# Patient Record
Sex: FEMALE | Race: WHITE | Employment: FULL TIME | URBAN - METROPOLITAN AREA
[De-identification: names, ages, dates, MRNs, and addresses within clinical notes are randomized per-mention and may not be internally consistent; named-entity substitution may affect disease eponyms.]

---

## 2017-07-03 ENCOUNTER — HOSPITAL ENCOUNTER (OUTPATIENT)
Dept: ULTRASOUND IMAGING | Facility: HOSPITAL | Age: 47
Discharge: HOME | End: 2017-07-03
Attending: FAMILY MEDICINE

## 2017-07-03 VITALS — HEART RATE: 72 BPM | DIASTOLIC BLOOD PRESSURE: 88 MMHG | SYSTOLIC BLOOD PRESSURE: 128 MMHG

## 2017-07-03 DIAGNOSIS — Z13.9 SCREENING PROCEDURE: ICD-10-CM

## 2017-08-11 PROCEDURE — 88175 CYTOPATH C/V AUTO FLUID REDO: CPT | Performed by: INTERNAL MEDICINE

## 2017-08-11 PROCEDURE — 87624 HPV HI-RISK TYP POOLED RSLT: CPT | Performed by: INTERNAL MEDICINE

## 2017-08-19 ENCOUNTER — HOSPITAL ENCOUNTER (OUTPATIENT)
Age: 47
Discharge: HOME | End: 2017-08-19
Attending: EMERGENCY MEDICINE
Payer: COMMERCIAL

## 2017-08-19 VITALS
WEIGHT: 190 LBS | SYSTOLIC BLOOD PRESSURE: 131 MMHG | BODY MASS INDEX: 32 KG/M2 | TEMPERATURE: 98 F | RESPIRATION RATE: 16 BRPM | OXYGEN SATURATION: 97 % | HEART RATE: 79 BPM | DIASTOLIC BLOOD PRESSURE: 87 MMHG

## 2017-08-19 DIAGNOSIS — H60.502 ACUTE OTITIS EXTERNA OF LEFT EAR, UNSPECIFIED TYPE: Primary | ICD-10-CM

## 2017-08-19 PROCEDURE — 99213 OFFICE O/P EST LOW 20 MIN: CPT

## 2017-08-19 PROCEDURE — 99214 OFFICE O/P EST MOD 30 MIN: CPT

## 2017-08-19 RX ORDER — CIPROFLOXACIN AND DEXAMETHASONE 3; 1 MG/ML; MG/ML
4 SUSPENSION/ DROPS AURICULAR (OTIC) 2 TIMES DAILY
Qty: 7.5 ML | Refills: 0 | Status: SHIPPED | OUTPATIENT
Start: 2017-08-19 | End: 2017-08-19

## 2017-08-19 RX ORDER — AMOXICILLIN AND CLAVULANATE POTASSIUM 875; 125 MG/1; MG/1
1 TABLET, FILM COATED ORAL 2 TIMES DAILY
Qty: 20 TABLET | Refills: 0 | Status: SHIPPED | OUTPATIENT
Start: 2017-08-19 | End: 2017-08-29

## 2017-08-19 RX ORDER — CIPROFLOXACIN AND DEXAMETHASONE 3; 1 MG/ML; MG/ML
4 SUSPENSION/ DROPS AURICULAR (OTIC) 2 TIMES DAILY
Qty: 7.5 ML | Refills: 0 | Status: SHIPPED | OUTPATIENT
Start: 2017-08-19 | End: 2018-11-08 | Stop reason: ALTCHOICE

## 2017-08-19 RX ORDER — OFLOXACIN 3 MG/ML
5 SOLUTION AURICULAR (OTIC) DAILY
Qty: 10 ML | Refills: 1 | Status: SHIPPED | OUTPATIENT
Start: 2017-08-19 | End: 2017-08-19

## 2017-08-19 NOTE — ED INITIAL ASSESSMENT (HPI)
PCP gave patient ear drops . both ear is itchy and delicate. Hearing loss in early 20s unable to put hearing aids in.

## 2017-08-19 NOTE — ED PROVIDER NOTES
Patient Seen in: [facility] In [address] [place] [address]    History   Patient presents with:  Ear Problem Pain (neurosensory)    Stated Complaint: ear ache    HPI    The patient is a 80-year-old female with a past history of hearing loss, daily hear (800 MG TOTAL) BY MOUTH 3 (THREE) TIMES DAILY. loratadine 10 MG Oral Tab,  Take 10 mg by mouth nightly. ValACYclovir HCl 500 MG Oral Tab,  Take 1 tablet (500 mg total) by mouth once daily.    Multiple Vitamins-Iron (ONE DAILY/IRON) Oral Tab,  Take  by m Physical Exam    Constitutional: Well-developed well-nourished in no acute distress  Head: Normocephalic, no swelling or tenderness  Eyes: Nonicteric sclera, no conjunctival injection  ENT: Mild redness to the right external auditory canal but no s 1    Amoxicillin-Pot Clavulanate 875-125 MG Oral Tab  Take 1 tablet by mouth 2 (two) times daily. Qty: 20 tablet Refills: 0    ciprofloxacin-dexamethasone 0.3-0.1 % Otic Suspension  Place 4 drops into both ears 2 (two) times daily.   Qty: 7.5 mL Refills: 0

## 2017-09-18 PROBLEM — H60.313 ACUTE DIFFUSE OTITIS EXTERNA OF BOTH EARS: Status: ACTIVE | Noted: 2017-09-18

## 2017-10-28 PROCEDURE — 83002 ASSAY OF GONADOTROPIN (LH): CPT | Performed by: INTERNAL MEDICINE

## 2017-10-28 PROCEDURE — 82607 VITAMIN B-12: CPT | Performed by: INTERNAL MEDICINE

## 2017-10-28 PROCEDURE — 82746 ASSAY OF FOLIC ACID SERUM: CPT | Performed by: INTERNAL MEDICINE

## 2017-10-28 PROCEDURE — 82533 TOTAL CORTISOL: CPT | Performed by: INTERNAL MEDICINE

## 2017-10-28 PROCEDURE — 83001 ASSAY OF GONADOTROPIN (FSH): CPT | Performed by: INTERNAL MEDICINE

## 2017-10-28 PROCEDURE — 83835 ASSAY OF METANEPHRINES: CPT | Performed by: INTERNAL MEDICINE

## 2018-05-16 ENCOUNTER — MED REC SCAN ONLY (OUTPATIENT)
Dept: NEUROLOGY | Facility: CLINIC | Age: 48
End: 2018-05-16

## 2018-05-16 ENCOUNTER — TELEPHONE (OUTPATIENT)
Dept: NEUROLOGY | Facility: CLINIC | Age: 48
End: 2018-05-16

## 2018-05-16 ENCOUNTER — OFFICE VISIT (OUTPATIENT)
Dept: NEUROLOGY | Facility: CLINIC | Age: 48
End: 2018-05-16

## 2018-05-16 VITALS
RESPIRATION RATE: 16 BRPM | HEIGHT: 67 IN | SYSTOLIC BLOOD PRESSURE: 132 MMHG | BODY MASS INDEX: 29.82 KG/M2 | WEIGHT: 190 LBS | DIASTOLIC BLOOD PRESSURE: 90 MMHG | HEART RATE: 80 BPM

## 2018-05-16 DIAGNOSIS — M54.12 CERVICAL RADICULOPATHY: ICD-10-CM

## 2018-05-16 DIAGNOSIS — G95.9 MYELOPATHY (HCC): ICD-10-CM

## 2018-05-16 DIAGNOSIS — G56.03 BILATERAL CARPAL TUNNEL SYNDROME: Primary | ICD-10-CM

## 2018-05-16 PROCEDURE — 99204 OFFICE O/P NEW MOD 45 MIN: CPT | Performed by: OTHER

## 2018-05-16 RX ORDER — DOXEPIN HYDROCHLORIDE 10 MG/1
CAPSULE ORAL
Refills: 0 | COMMUNITY
Start: 2018-04-27 | End: 2019-10-31 | Stop reason: ALTCHOICE

## 2018-05-16 RX ORDER — BUPROPION HYDROCHLORIDE 150 MG/1
TABLET ORAL
Refills: 1 | COMMUNITY
Start: 2018-05-11 | End: 2018-05-31

## 2018-05-16 RX ORDER — TOPIRAMATE 25 MG/1
TABLET ORAL
Qty: 120 TABLET | Refills: 3 | Status: SHIPPED | OUTPATIENT
Start: 2018-05-16 | End: 2018-11-08 | Stop reason: ALTCHOICE

## 2018-05-16 NOTE — PROGRESS NOTES
Ms Lo Lied relates that she fell in February, falling backwards without head injury. This precipitated bilateral cervical paraspinal upper cervical paraspinal trip several, bilateral trapezial ridge pain and diffuse upper extremity paresthesias.   She is u Doxepin HCl 10 MG Oral Cap TK 1 C PO QHS Disp:  Rfl: 0   Aspirin-Acetaminophen-Caffeine (EXCEDRIN MIGRAINE OR) Take by mouth daily. Disp:  Rfl:    ibuprofen 100 MG/5ML Oral Suspension Take 200 mg by mouth as needed for Fever.  Disp:  Rfl:    aspirin 81 MG deficiency   • Depression     sees Dr. Jin Scot   • HEADACHES     migraines   • Herpes labialis    • HNP (herniated nucleus pulposus), cervical     C7   • Hyperlipidemia    • Hypothyroidism    • Pancreatitis 10/2016    2' heavy EtOH      Past Surgical History: otherwise  SKIN: denies any unusual skin lesions or rashes  EYES: no visual complaints or deficits  HEENT: denies nasal congestion, sinus pain or sore throat; hearing loss negative  RESPIRATORY: denies shortness of breath, wheezing or cough   CARDIOVASCULA spine.  I gave her prescription for physical therapy. I told her to call my office after the MRI scan and wait for the report prior to starting physical therapy.   Start her on Topamax for the migraines 25 mg twice daily for week and then 50 mg twice daily VITAMIN E 1 qd Disp:  Rfl:    VITAMIN B COMPLEX OR 1 qd Disp:  Rfl:        No Follow-up on file.     Saeed Ann MD, MD

## 2018-05-16 NOTE — TELEPHONE ENCOUNTER
Called [facility] BS for authorization of approval of MRI C-spine wo. Per automated system,  no authorization is required. Reference # [id number] Will call Pt. to inform. Pt. Informed no authorization is required. Transferred call to scheduling for   appt.

## 2018-05-18 ENCOUNTER — HOSPITAL ENCOUNTER (OUTPATIENT)
Dept: MRI IMAGING | Age: 48
Discharge: HOME | End: 2018-05-18
Attending: Other
Payer: COMMERCIAL

## 2018-05-18 ENCOUNTER — TELEPHONE (OUTPATIENT)
Dept: NEUROLOGY | Facility: CLINIC | Age: 48
End: 2018-05-18

## 2018-05-18 DIAGNOSIS — G95.9 MYELOPATHY (HCC): ICD-10-CM

## 2018-05-18 PROCEDURE — 72141 MRI NECK SPINE W/O DYE: CPT | Performed by: OTHER

## 2018-05-18 NOTE — TELEPHONE ENCOUNTER
Please call the patient tell her reviewed the report on the MRI scan of the cervical spine. And reveal a significant arthritic changes. No herniated disc. No spinal cord injury.   Please ask her to call the office in 2–3 weeks to inform me of how she is

## 2018-07-10 ENCOUNTER — HOSPITAL ENCOUNTER (EMERGENCY)
Facility: HOSPITAL | Age: 48
Discharge: HOME | End: 2018-07-10
Attending: EMERGENCY MEDICINE
Payer: COMMERCIAL

## 2018-07-10 VITALS
WEIGHT: 190 LBS | SYSTOLIC BLOOD PRESSURE: 130 MMHG | OXYGEN SATURATION: 97 % | BODY MASS INDEX: 29.82 KG/M2 | RESPIRATION RATE: 18 BRPM | DIASTOLIC BLOOD PRESSURE: 73 MMHG | TEMPERATURE: 98 F | HEART RATE: 68 BPM | HEIGHT: 67 IN

## 2018-07-10 DIAGNOSIS — M62.838 SPASM OF MUSCLE: Primary | ICD-10-CM

## 2018-07-10 PROCEDURE — 99284 EMERGENCY DEPT VISIT MOD MDM: CPT

## 2018-07-10 PROCEDURE — 96372 THER/PROPH/DIAG INJ SC/IM: CPT

## 2018-07-10 RX ORDER — METAXALONE 800 MG/1
400 TABLET ORAL 3 TIMES DAILY
Qty: 16 TABLET | Refills: 0 | Status: SHIPPED | OUTPATIENT
Start: 2018-07-10 | End: 2018-11-08 | Stop reason: ALTCHOICE

## 2018-07-10 RX ORDER — KETOROLAC TROMETHAMINE 30 MG/ML
30 INJECTION, SOLUTION INTRAMUSCULAR; INTRAVENOUS ONCE
Status: COMPLETED | OUTPATIENT
Start: 2018-07-10 | End: 2018-07-10

## 2018-07-10 RX ORDER — LIDOCAINE 50 MG/G
1 PATCH TOPICAL ONCE
Status: DISCONTINUED | OUTPATIENT
Start: 2018-07-10 | End: 2018-07-10

## 2018-07-10 RX ORDER — DEXAMETHASONE SODIUM PHOSPHATE 4 MG/ML
10 VIAL (ML) INJECTION ONCE
Status: COMPLETED | OUTPATIENT
Start: 2018-07-10 | End: 2018-07-10

## 2018-07-10 RX ORDER — OXYCODONE HYDROCHLORIDE AND ACETAMINOPHEN 5; 325 MG/1; MG/1
1-2 TABLET ORAL EVERY 4 HOURS PRN
Qty: 10 TABLET | Refills: 0 | Status: SHIPPED | OUTPATIENT
Start: 2018-07-10 | End: 2018-07-17

## 2018-07-10 RX ORDER — OXYCODONE HYDROCHLORIDE AND ACETAMINOPHEN 5; 325 MG/1; MG/1
1 TABLET ORAL ONCE
Status: COMPLETED | OUTPATIENT
Start: 2018-07-10 | End: 2018-07-10

## 2018-07-10 RX ORDER — DIAZEPAM 10 MG/1
10 TABLET ORAL ONCE
Status: COMPLETED | OUTPATIENT
Start: 2018-07-10 | End: 2018-07-10

## 2018-07-10 NOTE — ED NOTES
Again expressing some relief of pain, initially 10/10 and now 7/10.  sts \"it comes in waves but I do feel a little better\"

## 2018-07-10 NOTE — ED NOTES
Pt admits to feeling much better, pt is now able to sit upright on cart.   sts \"it took the edge off a little\"

## 2018-07-10 NOTE — ED INITIAL ASSESSMENT (HPI)
Patient reports she bent over to put her pants on this morning exacerbating her lower back pain and left hip pain    Currently in PT for sciatica and IT band issues

## 2018-07-10 NOTE — ED NOTES
Discharge instructions given and reviewed, told to follow up with pmd.  Medications as told side effects discussed. Return with any new or worsening symptoms. Verbalized knowledge. Pt escorted to exit, home with family member.

## 2018-07-10 NOTE — ED PROVIDER NOTES
Patient Seen in: [facility] AND [facility] Emergency Department    History   Patient presents with:  Back Pain (musculoskeletal)    Stated Complaint: back pain    HPI    27-year-old female presents for complaint of left lower back, buttock, thigh pain.   Patient Standard drinks or equivalent: 21 per week     Comment: occ      Review of Systems   Constitutional: Negative. HENT: Negative. Eyes: Negative. Respiratory: Negative. Cardiovascular: Negative. Gastrointestinal: Negative.     Genitourinary: Neurological: She is alert and oriented to person, place, and time. She has normal strength. No cranial nerve deficit or sensory deficit. Coordination normal.   Reflex Scores:       Patellar reflexes are 2+ on the right side and 2+ on the left side.   Skin: Take 1-2 tablets by mouth every 4 (four) hours as needed for Pain., Print Script, Disp-10 tablet, R-0    !! Metaxalone 800 MG Oral Tab  Take 0.5 tablets (400 mg total) by mouth 3 (three) times daily. , Normal, Disp-16 tablet, R-0    !! - Potential duplicate

## 2018-07-19 ENCOUNTER — MED REC SCAN ONLY (OUTPATIENT)
Dept: NEUROLOGY | Facility: CLINIC | Age: 48
End: 2018-07-19

## 2018-09-09 ENCOUNTER — HOSPITAL ENCOUNTER (OUTPATIENT)
Age: 48
Discharge: HOME | End: 2018-09-09
Attending: EMERGENCY MEDICINE
Payer: COMMERCIAL

## 2018-09-09 VITALS
BODY MASS INDEX: 29 KG/M2 | TEMPERATURE: 98 F | DIASTOLIC BLOOD PRESSURE: 81 MMHG | HEART RATE: 84 BPM | OXYGEN SATURATION: 97 % | SYSTOLIC BLOOD PRESSURE: 124 MMHG | RESPIRATION RATE: 16 BRPM | WEIGHT: 188 LBS
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
## 2018-09-09 DIAGNOSIS — S41.151A DOG BITE OF RIGHT UPPER EXTREMITY, INITIAL ENCOUNTER: Primary | ICD-10-CM

## 2018-09-09 PROCEDURE — 99213 OFFICE O/P EST LOW 20 MIN: CPT

## 2018-09-09 PROCEDURE — 99214 OFFICE O/P EST MOD 30 MIN: CPT

## 2018-09-09 RX ORDER — AMOXICILLIN AND CLAVULANATE POTASSIUM 875; 125 MG/1; MG/1
1 TABLET, FILM COATED ORAL 2 TIMES DAILY
Qty: 14 TABLET | Refills: 0 | Status: SHIPPED | OUTPATIENT
Start: 2018-09-09 | End: 2018-09-16

## 2018-09-09 NOTE — ED PROVIDER NOTES
Patient Seen in: [facility] In [address] [place] [address]    History   Patient presents with:  Laceration Abrasion (integumentary)    Stated Complaint: left hand infection    HPI    The patient is a 80-year-old female with past history of adrenal solo 124/81   Pulse 84   Resp 16   Temp 97.8 °F (36.6 °C)   Temp src Oral   SpO2 97 %   O2 Device None (Room air)       Current:/81   Pulse 84   Temp 97.8 °F (36.6 °C) (Oral)   Resp 16   Wt 85.3 kg   LMP 08/15/2018 (Approximate)   SpO2 97%   BMI 29.44 kg/

## 2018-10-08 ENCOUNTER — MED REC SCAN ONLY (OUTPATIENT)
Dept: NEUROLOGY | Facility: CLINIC | Age: 48
End: 2018-10-08

## 2018-11-09 ENCOUNTER — MED REC SCAN ONLY (OUTPATIENT)
Dept: NEUROLOGY | Facility: CLINIC | Age: 48
End: 2018-11-09

## 2019-10-15 NOTE — H&P
[address] [facility] [address] Gastroenterology                                                                                                  Clinic History and Physical     Pa and weight are stable. Known history of hypothyroid and mass on her adrenal gland followed by Dr. Chancy Bamberger [facility] endocrinology). States her thyroid function is stable.     Denies history of bleeding or clotting disorders          Pertinent Surgical Hx:  No History    Tobacco Use      Smoking status: Former Smoker        Packs/day: 0.50        Years: 20.00        Pack years: 8        Quit date: 6/2/2004        Years since quitting: 15.3      Smokeless tobacco: Never Used      Tobacco comment: quit 10 yrs ago as directed, Disp: 1 Package, Rfl: 0  Neomycin-Polymyxin-HC 3.5-65167-9 Otic Suspension, 2 drops both ears BID x 1 month, Disp: 1 Bottle, Rfl: 1  Hydrocortisone-Acetic Acid 1-2 % Otic Solution, 2 drops to affected ear BID x 3 weeks, Disp: 1 Bottle, Rfl: 1 abnormal bowel sounds noted, no masses are palpated  : no CVA tenderness  Skin: dry, warm, no jaundice  Ext: no cyanosis, clubbing or edema is evident.    Neuro: Alert and oriented x4, and patient is having movements of all 4 extremities   Psych: Pt has a of IBS. Would recommend FODMAP, probiotics, fiber supplements, and trial of dicyclomine as needed. Plan for follow-up after procedure or sooner if new issues arise.   She also requested names of primary care physicians as she would like to establish with All questions were answered to the patient’s satisfaction. The patient signed informed consent and elected to proceed with colonoscopy with intervention [i.e. polypectomy, stent placement, etc.] as indicated.           Orders This Visit:  Orders Placed This

## 2019-10-22 ENCOUNTER — OFFICE VISIT (OUTPATIENT)
Dept: GASTROENTEROLOGY | Facility: CLINIC | Age: 49
End: 2019-10-22
Payer: COMMERCIAL

## 2019-10-22 ENCOUNTER — TELEPHONE (OUTPATIENT)
Dept: GASTROENTEROLOGY | Facility: CLINIC | Age: 49
End: 2019-10-22

## 2019-10-22 VITALS
DIASTOLIC BLOOD PRESSURE: 82 MMHG | SYSTOLIC BLOOD PRESSURE: 125 MMHG | BODY MASS INDEX: 34.16 KG/M2 | HEART RATE: 80 BPM | HEIGHT: 65 IN | WEIGHT: 205 LBS

## 2019-10-22 DIAGNOSIS — R10.9 ABDOMINAL CRAMPING: ICD-10-CM

## 2019-10-22 DIAGNOSIS — K21.9 GASTROESOPHAGEAL REFLUX DISEASE, ESOPHAGITIS PRESENCE NOT SPECIFIED: ICD-10-CM

## 2019-10-22 DIAGNOSIS — R19.8 IRREGULAR BOWEL HABITS: Primary | ICD-10-CM

## 2019-10-22 DIAGNOSIS — Z12.11 SCREENING FOR COLON CANCER: ICD-10-CM

## 2019-10-22 PROCEDURE — 99204 OFFICE O/P NEW MOD 45 MIN: CPT | Performed by: NURSE PRACTITIONER

## 2019-10-22 NOTE — PATIENT INSTRUCTIONS
Recommend:  -Schedule colonoscopy w/Dr. Maria Dolores Morgan, Dr. More Bess, Dr. Betty Hastings w/ MAC  Dx: screening, irregular bowel habits, abd cramping   -Eligible for NE: No  -Prep: Split dose Colyte or equivalent  -Anti-platelets and anti-coagulants: ASA - continue as pr

## 2019-10-22 NOTE — TELEPHONE ENCOUNTER
Scheduled for: Colonoscopy 25609  Provider Name: Dr Marixa Torres  Date:  Susana Claude 12/24/19  Location:  [facility]  Sedation: MAC  Time: 1:00 pm, pt aware that Count includes the [facility] will call day before with arrival time  Prep: split dose colyte  Meds/Allergies Reconciled?: Cortisporin, Code

## 2019-10-26 ENCOUNTER — APPOINTMENT (OUTPATIENT)
Dept: LAB | Age: 49
End: 2019-10-26
Attending: NURSE PRACTITIONER
Payer: COMMERCIAL

## 2019-10-26 DIAGNOSIS — R10.9 ABDOMINAL CRAMPING: ICD-10-CM

## 2019-10-26 DIAGNOSIS — R19.8 IRREGULAR BOWEL HABITS: ICD-10-CM

## 2019-10-26 PROCEDURE — 36415 COLL VENOUS BLD VENIPUNCTURE: CPT | Performed by: NURSE PRACTITIONER

## 2019-10-26 PROCEDURE — 82784 ASSAY IGA/IGD/IGG/IGM EACH: CPT | Performed by: NURSE PRACTITIONER

## 2019-10-26 PROCEDURE — 83516 IMMUNOASSAY NONANTIBODY: CPT

## 2019-10-31 ENCOUNTER — OFFICE VISIT (OUTPATIENT)
Dept: FAMILY MEDICINE CLINIC | Facility: CLINIC | Age: 49
End: 2019-10-31
Payer: COMMERCIAL

## 2019-10-31 VITALS
DIASTOLIC BLOOD PRESSURE: 85 MMHG | BODY MASS INDEX: 33.91 KG/M2 | TEMPERATURE: 98 F | WEIGHT: 203.5 LBS | SYSTOLIC BLOOD PRESSURE: 122 MMHG | HEART RATE: 86 BPM | HEIGHT: 65 IN

## 2019-10-31 DIAGNOSIS — E03.9 HYPOTHYROIDISM, UNSPECIFIED TYPE: ICD-10-CM

## 2019-10-31 DIAGNOSIS — F41.9 ANXIETY AND DEPRESSION: ICD-10-CM

## 2019-10-31 DIAGNOSIS — Z86.69 HISTORY OF MIGRAINE HEADACHES: ICD-10-CM

## 2019-10-31 DIAGNOSIS — F10.21 HISTORY OF ALCOHOLISM (HCC): ICD-10-CM

## 2019-10-31 DIAGNOSIS — Z87.39 HISTORY OF HERNIATED INTERVERTEBRAL DISC: ICD-10-CM

## 2019-10-31 DIAGNOSIS — Z00.00 WELL ADULT EXAM: Primary | ICD-10-CM

## 2019-10-31 DIAGNOSIS — E78.00 HYPERCHOLESTEREMIA: ICD-10-CM

## 2019-10-31 DIAGNOSIS — Z86.39 HISTORY OF IRON DEFICIENCY: ICD-10-CM

## 2019-10-31 DIAGNOSIS — L65.9 HAIR LOSS: ICD-10-CM

## 2019-10-31 DIAGNOSIS — D35.02 ADENOMA OF LEFT ADRENAL GLAND: ICD-10-CM

## 2019-10-31 DIAGNOSIS — K76.9 LIVER LESION, RIGHT LOBE: ICD-10-CM

## 2019-10-31 DIAGNOSIS — R39.15 URINARY URGENCY: ICD-10-CM

## 2019-10-31 DIAGNOSIS — F32.A ANXIETY AND DEPRESSION: ICD-10-CM

## 2019-10-31 DIAGNOSIS — Z97.4 HEARING AID WORN: ICD-10-CM

## 2019-10-31 DIAGNOSIS — E66.9 OBESITY (BMI 30.0-34.9): ICD-10-CM

## 2019-10-31 PROBLEM — E66.811 OBESITY (BMI 30.0-34.9): Status: ACTIVE | Noted: 2019-10-31

## 2019-10-31 PROBLEM — H60.313 ACUTE DIFFUSE OTITIS EXTERNA OF BOTH EARS: Status: RESOLVED | Noted: 2017-09-18 | Resolved: 2019-10-31

## 2019-10-31 PROCEDURE — 99386 PREV VISIT NEW AGE 40-64: CPT | Performed by: FAMILY MEDICINE

## 2019-10-31 PROCEDURE — 99202 OFFICE O/P NEW SF 15 MIN: CPT | Performed by: FAMILY MEDICINE

## 2019-10-31 RX ORDER — GABAPENTIN 300 MG/1
CAPSULE ORAL 3 TIMES DAILY
Refills: 2 | COMMUNITY
Start: 2019-10-25 | End: 2020-07-30

## 2019-10-31 NOTE — PROGRESS NOTES
HPI:    Patient ID: Sunitha Duong is a 52year old female. HPI  No chief complaint on file. Saw gyne at [place] and had pap 7/2019  As mammo order, doing exam Monday    Wants to mammo every 2 yrs.   Getting colonoscopy Dec 24 2019  Has seen GI for dizziness, seizures, syncope, weakness, numbness and headaches. Hematological: Does not bruise/bleed easily. Psychiatric/Behavioral: Negative for behavioral problems, decreased concentration, self-injury, sleep disturbance and suicidal ideas.  The p session: Not on file      Stress: Not on file    Relationships      Social connections:        Talks on phone: Not on file        Gets together: Not on file        Attends [employer] service: Not on file        Active member of club or organization: Not on 08/11/2020  Annual Depression Screen due on 10/22/2020     gabapentin 300 MG Oral Cap, 300 MG tablet twice a day, Disp: , Rfl: 2  Lysine HCl 1000 MG Oral Tab, Take 1 tablet by mouth., Disp: , Rfl:   lurasidone HCl (LATUDA) 40 MG Oral Tab, Take 60 mg by abdelrahman normal.   Nose: Nose normal.   Mouth/Throat: Oropharynx is clear and moist. No oropharyngeal exudate. Eyes: Pupils are equal, round, and reactive to light. Conjunctivae and EOM are normal. Right eye exhibits no discharge. Left eye exhibits no discharge. UROGYNECOLOGY CLINIC    3. Urinary urgency    - URINALYSIS, ROUTINE; Future    4. History of herniated intervertebral disc      5. Adenoma of left adrenal gland  Having MRI done next week    6. Liver lesion, right lobe  As above    7.  Hypothyroidism, unspe

## 2019-11-09 ENCOUNTER — APPOINTMENT (OUTPATIENT)
Dept: LAB | Age: 49
End: 2019-11-09
Attending: NURSE PRACTITIONER
Payer: COMMERCIAL

## 2019-11-16 ENCOUNTER — LAB ENCOUNTER (OUTPATIENT)
Dept: LAB | Age: 49
End: 2019-11-16
Attending: FAMILY MEDICINE
Payer: COMMERCIAL

## 2019-11-16 DIAGNOSIS — R39.15 URINARY URGENCY: ICD-10-CM

## 2019-11-16 DIAGNOSIS — Z00.00 WELL ADULT EXAM: ICD-10-CM

## 2019-11-16 DIAGNOSIS — Z86.39 HISTORY OF IRON DEFICIENCY: ICD-10-CM

## 2019-11-16 DIAGNOSIS — E66.9 OBESITY (BMI 30.0-34.9): ICD-10-CM

## 2019-11-16 PROCEDURE — 80076 HEPATIC FUNCTION PANEL: CPT

## 2019-11-16 PROCEDURE — 82306 VITAMIN D 25 HYDROXY: CPT

## 2019-11-16 PROCEDURE — 36415 COLL VENOUS BLD VENIPUNCTURE: CPT

## 2019-11-16 PROCEDURE — 80048 BASIC METABOLIC PNL TOTAL CA: CPT

## 2019-11-16 PROCEDURE — 83036 HEMOGLOBIN GLYCOSYLATED A1C: CPT

## 2019-11-16 PROCEDURE — 85025 COMPLETE CBC W/AUTO DIFF WBC: CPT

## 2019-11-16 PROCEDURE — 82607 VITAMIN B-12: CPT

## 2019-11-16 PROCEDURE — 84466 ASSAY OF TRANSFERRIN: CPT

## 2019-11-16 PROCEDURE — 82728 ASSAY OF FERRITIN: CPT

## 2019-11-16 PROCEDURE — 83540 ASSAY OF IRON: CPT

## 2019-11-16 PROCEDURE — 84439 ASSAY OF FREE THYROXINE: CPT

## 2019-11-16 PROCEDURE — 80061 LIPID PANEL: CPT

## 2019-11-16 PROCEDURE — 81001 URINALYSIS AUTO W/SCOPE: CPT

## 2019-11-16 PROCEDURE — 84443 ASSAY THYROID STIM HORMONE: CPT

## 2019-11-19 ENCOUNTER — OFFICE VISIT (OUTPATIENT)
Dept: OTOLARYNGOLOGY | Facility: CLINIC | Age: 49
End: 2019-11-19
Payer: COMMERCIAL

## 2019-11-19 VITALS
BODY MASS INDEX: 33.82 KG/M2 | WEIGHT: 203 LBS | DIASTOLIC BLOOD PRESSURE: 94 MMHG | TEMPERATURE: 98 F | HEIGHT: 65 IN | SYSTOLIC BLOOD PRESSURE: 151 MMHG

## 2019-11-19 DIAGNOSIS — H60.62 CHRONIC OTITIS EXTERNA OF LEFT EAR, UNSPECIFIED TYPE: Primary | ICD-10-CM

## 2019-11-19 PROCEDURE — 99243 OFF/OP CNSLTJ NEW/EST LOW 30: CPT | Performed by: OTOLARYNGOLOGY

## 2019-11-20 NOTE — PROGRESS NOTES
Kolby Saez is a 52year old female.  Patient presents with:  Ear Problem: pt reports wears hearing aids, possible fungal infection, itchiness in both ears for 5-6 yrs     HPI:   She has had problems with drainage and itchiness in her ears for many heavy EtOH      Social History:  Social History    Tobacco Use      Smoking status: Former Smoker        Packs/day: 0.50        Years: 20.00        Pack years: 10        Quit date: 6/2/2004        Years since quitting: 15.4      Smokeless tobacco: Never Us unspecified type  She has what appears to be a chronic otitis externa both ear canals.   I cultured the ear canal today and will consider further intervention based upon the results of the culture  - AEROBIC BACTERIAL CULTURE; Future  - AEROBIC BACTERIAL CU

## 2019-11-21 DIAGNOSIS — R94.6 BORDERLINE ABNORMAL THYROID FUNCTION TEST: Primary | ICD-10-CM

## 2019-12-03 ENCOUNTER — OFFICE VISIT (OUTPATIENT)
Dept: UROLOGY | Facility: HOSPITAL | Age: 49
End: 2019-12-03
Attending: OBSTETRICS & GYNECOLOGY
Payer: COMMERCIAL

## 2019-12-03 VITALS
SYSTOLIC BLOOD PRESSURE: 140 MMHG | RESPIRATION RATE: 20 BRPM | DIASTOLIC BLOOD PRESSURE: 98 MMHG | WEIGHT: 203 LBS | BODY MASS INDEX: 33.82 KG/M2 | HEIGHT: 65 IN

## 2019-12-03 DIAGNOSIS — N32.81 OVERACTIVE BLADDER: Primary | ICD-10-CM

## 2019-12-03 PROCEDURE — 99212 OFFICE O/P EST SF 10 MIN: CPT

## 2019-12-03 PROCEDURE — 87086 URINE CULTURE/COLONY COUNT: CPT | Performed by: OBSTETRICS & GYNECOLOGY

## 2019-12-03 PROCEDURE — 51701 INSERT BLADDER CATHETER: CPT

## 2019-12-03 PROCEDURE — 81003 URINALYSIS AUTO W/O SCOPE: CPT | Performed by: OBSTETRICS & GYNECOLOGY

## 2019-12-03 NOTE — PROGRESS NOTES
ID: Macie Adams  : 1970  Date: 12/3/2019     Referred by Dr. Mara Rodriguez MD    Patient presents with:  Urinary Urgency  Incontinence      HPI:  The patient is a 52year-old female, , who presents for evaluation of urinary incontinence fo Grandmother         lung ca   • Heart Disorder Paternal Grandfather         MI   • Heart Disorder Paternal Uncle 61        CHF, CAD, PVD   • Heart Disorder Paternal Uncle 72         pacemaker, PVD      Social History    Tobacco Use      Smoking status: For ), Disp: 1 Bottle, Rfl: 2  triamcinolone acetonide 0.1 % External Cream, Apply to ear canal nightly with a q tip.  (Patient not taking: Reported on 12/3/2019 ), Disp: 60 g, Rfl: 3  CALCIUM + D OR, 1200 1 bid, Disp: , Rfl:   VITAMIN C OR, 1 qd, Disp: , Rfl: soft, mobile, non tender  Adnexa:no masses, non tender  Perineum: non tender  Anus: no hemorrhoids  Rectum: deferred.      PELVIS FLOOR NEUROMUSCULAR FUNCTION:  Strength:  3/5  Perineal Sensation:  Normal      PELVIC SUPPORT:  [place]:  0  Ant:  0  Post:  0  C

## 2019-12-10 ENCOUNTER — TELEPHONE (OUTPATIENT)
Dept: FAMILY MEDICINE CLINIC | Facility: CLINIC | Age: 49
End: 2019-12-10

## 2019-12-10 NOTE — TELEPHONE ENCOUNTER
Received last Pap results done in July 2019 at [address]  [facility]. These were normal.  Entered into health maintenance.   Patient also sent in note where notations were made by [facility] guarding AST ALT and alkaline phosphatase was elevated m

## 2019-12-23 NOTE — PROGRESS NOTES
[facility] Follow-up Visit    Dora biopsies demonstrate increased intraepithelial lymphocytes which could be consistent with lymphocytic/microscopic colitis     Social Hx:  + Former smoker  +21 EtOH/week  - Denies illicit drug use   - LMP:  Perimenopausal  - NSAIDs/ASA use: ASA 81 mg daily as needed for Migraine. 9 tablet 11   • gabapentin 300 MG Oral Cap 300 MG tablet twice a day  2   • Lysine HCl 1000 MG Oral Tab Take 1 tablet by mouth. • HM OMEGA-3-6-9 FATTY ACIDS OR Take by mouth.      • VALACYCLOVIR HCL 1 G Oral Tab TAKE 2 TABLETS(20 stiffness and joint swelling  BEHAVIOR/PSYCH:  negative for depressed mood    PHYSICAL EXAM:   Blood pressure (!) 155/100, pulse 91, height 5' 5\" (1.651 m), weight 207 lb (93.9 kg), not currently breastfeeding.     Gen: patient appears comfortable and in n follow up with PCP. Denies chest pain, shortness of breath, unusual headaches, lightheadedness, dizziness. Recommend:  -Discuss colonoscopy biopsy findings with attending GI  -Possible treatment for microscopic/lymphocytic colitis with budesonide?

## 2019-12-24 ENCOUNTER — SURGERY CENTER DOCUMENTATION (OUTPATIENT)
Dept: SURGERY | Age: 49
End: 2019-12-24

## 2019-12-24 ENCOUNTER — LAB REQUISITION (OUTPATIENT)
Dept: LAB | Facility: HOSPITAL | Age: 49
End: 2019-12-24
Payer: COMMERCIAL

## 2019-12-24 ENCOUNTER — TELEPHONE (OUTPATIENT)
Dept: GASTROENTEROLOGY | Facility: CLINIC | Age: 49
End: 2019-12-24

## 2019-12-24 DIAGNOSIS — Z01.89 ENCOUNTER FOR OTHER SPECIFIED SPECIAL EXAMINATIONS: ICD-10-CM

## 2019-12-24 PROCEDURE — 88305 TISSUE EXAM BY PATHOLOGIST: CPT | Performed by: INTERNAL MEDICINE

## 2019-12-24 PROCEDURE — 88313 SPECIAL STAINS GROUP 2: CPT | Performed by: INTERNAL MEDICINE

## 2019-12-24 PROCEDURE — 45380 COLONOSCOPY AND BIOPSY: CPT | Performed by: INTERNAL MEDICINE

## 2019-12-24 NOTE — TELEPHONE ENCOUNTER
Pt called to let us know she did not split her bowel prep. She actually completed the whole prep last night. She wanted to make sure Dr Mor Mcnally would be able to complete the procedure today.     I asked and she responded she is running clear    I told h

## 2019-12-24 NOTE — PROCEDURES
COLONOSCOPY REPORT    Alisa Reading     1970 Age 52year old   PCP Syed Esteves MD Endoscopist Marlena Rosales MD     Date of procedure: 19    Procedure: Colonoscopy w/ MAC sedation and random biopsies of the colon    Pre-operative diagno without evidence of angioectasias or inflammation. 5. PARMINDER: normal rectal tone, no masses palpated. Impression:   · No etiology for irregular bowels and pain  · Random biopsies taken to rule out microscopic colitis    Recommend:  · Await pathology.

## 2019-12-26 ENCOUNTER — TELEPHONE (OUTPATIENT)
Dept: GASTROENTEROLOGY | Facility: CLINIC | Age: 49
End: 2019-12-26

## 2019-12-26 NOTE — TELEPHONE ENCOUNTER
Pt had [facility] on 12/24 - DR told her there were no polyps - later a RN came in and told her they were sending out something to be biopsied - asking what is being biopsied if there are no polyps

## 2019-12-26 NOTE — TELEPHONE ENCOUNTER
I spoke to the pt and she was notified that Dr Eva Miramontes took random biopsies of her colon to r/o microscopic colitis. I also told her the pathology was also still in process.     The pt verbalizes understanding

## 2019-12-30 ENCOUNTER — OFFICE VISIT (OUTPATIENT)
Dept: GASTROENTEROLOGY | Facility: CLINIC | Age: 49
End: 2019-12-30
Payer: COMMERCIAL

## 2019-12-30 VITALS
SYSTOLIC BLOOD PRESSURE: 146 MMHG | HEIGHT: 65 IN | WEIGHT: 207 LBS | DIASTOLIC BLOOD PRESSURE: 90 MMHG | HEART RATE: 91 BPM | BODY MASS INDEX: 34.49 KG/M2

## 2019-12-30 DIAGNOSIS — R19.8 IRREGULAR BOWEL HABITS: Primary | ICD-10-CM

## 2019-12-30 DIAGNOSIS — R10.9 ABDOMINAL CRAMPING: ICD-10-CM

## 2019-12-30 PROCEDURE — 99214 OFFICE O/P EST MOD 30 MIN: CPT | Performed by: NURSE PRACTITIONER

## 2020-01-10 ENCOUNTER — PATIENT MESSAGE (OUTPATIENT)
Dept: GASTROENTEROLOGY | Facility: CLINIC | Age: 50
End: 2020-01-10

## 2020-01-10 ENCOUNTER — TELEPHONE (OUTPATIENT)
Dept: GASTROENTEROLOGY | Facility: CLINIC | Age: 50
End: 2020-01-10

## 2020-01-10 RX ORDER — BUDESONIDE 9 MG/1
9 TABLET, FILM COATED, EXTENDED RELEASE ORAL DAILY
Qty: 48 TABLET | Refills: 0 | Status: SHIPPED | OUTPATIENT
Start: 2020-01-10 | End: 2020-02-09

## 2020-01-10 NOTE — TELEPHONE ENCOUNTER
From: Minnie Simmonds  To: JOSEPHINE Glez  Sent: 1/10/2020 8:28 AM CST  Subject: Test Results Question    Hello,    I met with you over a week ago and you were awaiting a response from another doctor as to whether or not the colitis she identifie

## 2020-01-13 NOTE — TELEPHONE ENCOUNTER
This message was already addressed by Dr. Marixa Torres.   See result note attached to pathology dated 12/24/2019

## 2020-02-22 ENCOUNTER — HOSPITAL ENCOUNTER (OUTPATIENT)
Age: 50
Discharge: HOME | End: 2020-02-22
Attending: EMERGENCY MEDICINE
Payer: COMMERCIAL

## 2020-02-22 VITALS
DIASTOLIC BLOOD PRESSURE: 85 MMHG | RESPIRATION RATE: 18 BRPM | SYSTOLIC BLOOD PRESSURE: 141 MMHG | HEART RATE: 84 BPM | TEMPERATURE: 98 F | OXYGEN SATURATION: 98 %

## 2020-02-22 DIAGNOSIS — J06.9 VIRAL URI WITH COUGH: Primary | ICD-10-CM

## 2020-02-22 PROCEDURE — 99213 OFFICE O/P EST LOW 20 MIN: CPT

## 2020-02-22 PROCEDURE — 99214 OFFICE O/P EST MOD 30 MIN: CPT

## 2020-02-22 RX ORDER — BENZONATATE 100 MG/1
100 CAPSULE ORAL 3 TIMES DAILY PRN
Qty: 21 CAPSULE | Refills: 0 | Status: SHIPPED | OUTPATIENT
Start: 2020-02-22 | End: 2020-04-24

## 2020-02-22 RX ORDER — ALBUTEROL SULFATE 90 UG/1
2 AEROSOL, METERED RESPIRATORY (INHALATION) EVERY 4 HOURS PRN
Qty: 1 INHALER | Refills: 0 | Status: SHIPPED | OUTPATIENT
Start: 2020-02-22 | End: 2020-03-23

## 2020-02-22 NOTE — ED NOTES
Unable to swab for pertussis, to call pcp on Monday and speak to him about if he wants it done in office.

## 2020-04-27 PROBLEM — F33.9 RECURRENT MAJOR DEPRESSIVE DISORDER: Status: ACTIVE | Noted: 2020-04-27

## 2020-04-27 PROBLEM — F41.1 GAD (GENERALIZED ANXIETY DISORDER): Status: ACTIVE | Noted: 2020-04-27

## 2020-04-27 PROBLEM — F33.9 RECURRENT MAJOR DEPRESSIVE DISORDER (HCC): Status: ACTIVE | Noted: 2020-04-27

## 2020-05-11 ENCOUNTER — TELEPHONE (OUTPATIENT)
Dept: FAMILY MEDICINE CLINIC | Facility: CLINIC | Age: 50
End: 2020-05-11

## 2020-05-11 ENCOUNTER — VIRTUAL PHONE E/M (OUTPATIENT)
Dept: FAMILY MEDICINE CLINIC | Facility: CLINIC | Age: 50
End: 2020-05-11
Payer: COMMERCIAL

## 2020-05-11 VITALS — WEIGHT: 182 LBS | BODY MASS INDEX: 30 KG/M2

## 2020-05-11 DIAGNOSIS — E03.9 HYPOTHYROIDISM, UNSPECIFIED TYPE: ICD-10-CM

## 2020-05-11 DIAGNOSIS — E66.9 OBESITY (BMI 30.0-34.9): ICD-10-CM

## 2020-05-11 DIAGNOSIS — F41.9 ANXIETY AND DEPRESSION: ICD-10-CM

## 2020-05-11 DIAGNOSIS — E78.00 HYPERCHOLESTEREMIA: Primary | ICD-10-CM

## 2020-05-11 DIAGNOSIS — D35.02 ADENOMA OF LEFT ADRENAL GLAND: ICD-10-CM

## 2020-05-11 DIAGNOSIS — F32.A ANXIETY AND DEPRESSION: ICD-10-CM

## 2020-05-11 PROCEDURE — 99214 OFFICE O/P EST MOD 30 MIN: CPT | Performed by: FAMILY MEDICINE

## 2020-05-11 RX ORDER — ATORVASTATIN CALCIUM 20 MG/1
TABLET, FILM COATED ORAL
Qty: 90 TABLET | Refills: 0 | Status: SHIPPED | OUTPATIENT
Start: 2020-05-11 | End: 2020-09-20

## 2020-05-11 NOTE — TELEPHONE ENCOUNTER
Spoke with the patient who reports she has not been in contact with someone who was COVID-19 positive. Patient also denies having any symptom at the moment. Patient wanted to know if [facility] is offering antibody testing for COVID-19.  Patient made aware th

## 2020-05-11 NOTE — PROGRESS NOTES
Due to the COVID-19 emergency implementation plan, this patient's visit was converted to a telephone visit as agreed upon with the patient.     Please note that the following visit was completed using two-way, real-time interactive audio and/or video commun Hyperlipidemia 04/24/2020   • Hypothyroidism 04/24/2020   • Migraines 04/24/2020    Neurologist prescribing medication         MEDICATION LIST    Current Outpatient Medications:   •  escitalopram (LEXAPRO) 10 MG Oral Tab, 1/2 tablet daily x 6 days, then in available:     Wt 182 lb (82.6 kg)   BMI 30.29 kg/m²     Limited examination for this telephone/ video visit due to the coronavirus emergency    Patient was speaking in complete sentences, no increased work of breathing and very coherent and alert on the ph

## 2020-07-15 RX ORDER — VALACYCLOVIR HYDROCHLORIDE 1 G/1
2 TABLET, FILM COATED ORAL 2 TIMES DAILY
Qty: 4 TABLET | Refills: 2 | Status: SHIPPED | OUTPATIENT
Start: 2020-07-15 | End: 2021-06-23

## 2020-07-27 DIAGNOSIS — E78.00 HYPERCHOLESTEREMIA: ICD-10-CM

## 2020-07-27 DIAGNOSIS — E03.9 HYPOTHYROIDISM, UNSPECIFIED TYPE: ICD-10-CM

## 2020-07-28 ENCOUNTER — OFFICE VISIT (OUTPATIENT)
Dept: FAMILY MEDICINE CLINIC | Facility: CLINIC | Age: 50
End: 2020-07-28
Payer: COMMERCIAL

## 2020-07-28 VITALS
HEIGHT: 65 IN | HEART RATE: 86 BPM | DIASTOLIC BLOOD PRESSURE: 66 MMHG | WEIGHT: 186 LBS | TEMPERATURE: 98 F | SYSTOLIC BLOOD PRESSURE: 102 MMHG | BODY MASS INDEX: 30.99 KG/M2

## 2020-07-28 DIAGNOSIS — Z77.120 MOLD EXPOSURE: ICD-10-CM

## 2020-07-28 DIAGNOSIS — E78.2 MIXED HYPERLIPIDEMIA: ICD-10-CM

## 2020-07-28 DIAGNOSIS — F41.9 ANXIETY AND DEPRESSION: ICD-10-CM

## 2020-07-28 DIAGNOSIS — Z01.84 COVID-19 VIRUS IGG ANTIBODY TEST RESULT UNKNOWN: ICD-10-CM

## 2020-07-28 DIAGNOSIS — Z12.11 COLON CANCER SCREENING: ICD-10-CM

## 2020-07-28 DIAGNOSIS — Z00.00 WELL ADULT EXAM: Primary | ICD-10-CM

## 2020-07-28 DIAGNOSIS — Z86.69 HISTORY OF MIGRAINE HEADACHES: ICD-10-CM

## 2020-07-28 DIAGNOSIS — Z01.419 ENCOUNTER FOR GYNECOLOGICAL EXAMINATION: ICD-10-CM

## 2020-07-28 DIAGNOSIS — E03.9 HYPOTHYROIDISM, UNSPECIFIED TYPE: ICD-10-CM

## 2020-07-28 DIAGNOSIS — F32.A ANXIETY AND DEPRESSION: ICD-10-CM

## 2020-07-28 DIAGNOSIS — E78.00 HYPERCHOLESTEREMIA: ICD-10-CM

## 2020-07-28 DIAGNOSIS — E66.9 OBESITY (BMI 30.0-34.9): ICD-10-CM

## 2020-07-28 DIAGNOSIS — R05.9 COUGH: ICD-10-CM

## 2020-07-28 DIAGNOSIS — D35.02 ADENOMA OF LEFT ADRENAL GLAND: ICD-10-CM

## 2020-07-28 DIAGNOSIS — Z87.39 HISTORY OF HERNIATED INTERVERTEBRAL DISC: ICD-10-CM

## 2020-07-28 DIAGNOSIS — Z97.4 HEARING AID WORN: ICD-10-CM

## 2020-07-28 DIAGNOSIS — M25.511 ACUTE PAIN OF RIGHT SHOULDER: ICD-10-CM

## 2020-07-28 DIAGNOSIS — H91.93 BILATERAL HEARING LOSS, UNSPECIFIED HEARING LOSS TYPE: ICD-10-CM

## 2020-07-28 PROBLEM — Z86.39 HISTORY OF IRON DEFICIENCY: Status: RESOLVED | Noted: 2019-10-31 | Resolved: 2020-07-28

## 2020-07-28 PROBLEM — F33.9 RECURRENT MAJOR DEPRESSIVE DISORDER: Status: RESOLVED | Noted: 2020-04-27 | Resolved: 2020-07-28

## 2020-07-28 PROBLEM — F41.1 GAD (GENERALIZED ANXIETY DISORDER): Status: RESOLVED | Noted: 2020-04-27 | Resolved: 2020-07-28

## 2020-07-28 PROBLEM — F33.9 RECURRENT MAJOR DEPRESSIVE DISORDER (HCC): Status: RESOLVED | Noted: 2020-04-27 | Resolved: 2020-07-28

## 2020-07-28 PROCEDURE — 3074F SYST BP LT 130 MM HG: CPT | Performed by: FAMILY MEDICINE

## 2020-07-28 PROCEDURE — 99396 PREV VISIT EST AGE 40-64: CPT | Performed by: FAMILY MEDICINE

## 2020-07-28 PROCEDURE — 3078F DIAST BP <80 MM HG: CPT | Performed by: FAMILY MEDICINE

## 2020-07-28 PROCEDURE — 3008F BODY MASS INDEX DOCD: CPT | Performed by: FAMILY MEDICINE

## 2020-07-28 RX ORDER — TOPIRAMATE 100 MG/1
1 CAPSULE, EXTENDED RELEASE ORAL DAILY
COMMUNITY
Start: 2020-06-18 | End: 2021-05-11 | Stop reason: ALTCHOICE

## 2020-07-28 NOTE — PROGRESS NOTES
HPI:    Patient ID: Minh Rider is a 48year old female. HPI  Patient presents with: Well Adult        Review of Systems   Constitutional: Negative for activity change, appetite change, chills, fatigue, fever and unexpected weight change.    HEN History:   Diagnosis Date   • Adrenal adenoma, left    • ANEMIA     iron deficiency   • Depression     sees Dr. Abraham Rocha   • Hearing loss 04/24/2020    pt has bilat hearing aids   • Herpes labialis    • HNP (herniated nucleus pulposus), cervical     C7   • Hype file        Attends meetings of clubs or organizations: Not on file        Relationship status: Not on file      Intimate partner violence:        Fear of current or ex partner: Not on file        Emotionally abused: Not on file        Physically abused: N capsule by mouth daily.      • escitalopram (LEXAPRO) 10 MG Oral Tab one tablet daily 90 tablet 0   • atorvastatin 20 MG Oral Tab TAKE 1 TABLET(20 MG) BY MOUTH DAILY 90 tablet 0   • Levothyroxine Sodium 75 MCG Oral Tab       • Black Cohosh 540 MG Oral Cap T rash, tenderness, lesion or injury on the left labia. Cervix exhibits no motion tenderness, no discharge and no friability. Right adnexum displays no mass, no tenderness and no fullness. Left adnexum displays no mass, no tenderness and no fullness.     No v (CPT=71046); Future    7. COVID-19 virus IgG antibody test result unknown    - SARS-COV-2 IGG ANTIBODY; Future    8. Mold exposure    - XR CHEST PA + LAT CHEST (CPT=71046); Future    9. History of herniated intervertebral disc      10.  Acute pain of right

## 2020-07-29 ENCOUNTER — TELEPHONE (OUTPATIENT)
Dept: FAMILY MEDICINE CLINIC | Facility: CLINIC | Age: 50
End: 2020-07-29

## 2020-07-29 DIAGNOSIS — E03.9 HYPOTHYROIDISM, UNSPECIFIED TYPE: Primary | ICD-10-CM

## 2020-07-29 RX ORDER — LEVOTHYROXINE SODIUM 88 UG/1
88 TABLET ORAL
Qty: 90 TABLET | Refills: 1 | Status: SHIPPED | OUTPATIENT
Start: 2020-07-29 | End: 2020-10-06

## 2020-07-30 ENCOUNTER — TELEPHONE (OUTPATIENT)
Dept: FAMILY MEDICINE CLINIC | Facility: CLINIC | Age: 50
End: 2020-07-30

## 2020-07-30 ENCOUNTER — HOSPITAL ENCOUNTER (OUTPATIENT)
Dept: GENERAL RADIOLOGY | Facility: HOSPITAL | Age: 50
Discharge: HOME | End: 2020-07-30
Attending: FAMILY MEDICINE
Payer: COMMERCIAL

## 2020-07-30 DIAGNOSIS — Z77.120 MOLD EXPOSURE: ICD-10-CM

## 2020-07-30 DIAGNOSIS — R05.9 COUGH: ICD-10-CM

## 2020-07-30 PROCEDURE — 71046 X-RAY EXAM CHEST 2 VIEWS: CPT | Performed by: FAMILY MEDICINE

## 2020-07-30 NOTE — TELEPHONE ENCOUNTER
Please inform patient     received blood work results from [facility] lab done July 27, 2020. These were faxed to her office and do not appear in our epic system.     TSH is slightly elevated at 5.54 with T4 being normal.  Given that patient is taking levothy

## 2020-07-31 ENCOUNTER — LAB ENCOUNTER (OUTPATIENT)
Dept: LAB | Age: 50
End: 2020-07-31
Attending: FAMILY MEDICINE
Payer: COMMERCIAL

## 2020-07-31 DIAGNOSIS — Z01.84 COVID-19 VIRUS IGG ANTIBODY TEST RESULT UNKNOWN: ICD-10-CM

## 2020-07-31 DIAGNOSIS — Z00.00 WELL ADULT EXAM: ICD-10-CM

## 2020-07-31 LAB
ALBUMIN SERPL-MCNC: 3.5 G/DL (ref 3.4–5)
ALBUMIN/GLOB SERPL: 0.9 {RATIO} (ref 1–2)
ALP LIVER SERPL-CCNC: 60 U/L (ref 39–100)
ALT SERPL-CCNC: 23 U/L (ref 13–56)
ANION GAP SERPL CALC-SCNC: 6 MMOL/L (ref 0–18)
AST SERPL-CCNC: 16 U/L (ref 15–37)
BASOPHILS # BLD AUTO: 0.06 X10(3) UL (ref 0–0.2)
BASOPHILS NFR BLD AUTO: 1.3 %
BILIRUB SERPL-MCNC: 0.3 MG/DL (ref 0.1–2)
BUN BLD-MCNC: 18 MG/DL (ref 7–18)
BUN/CREAT SERPL: 20 (ref 10–20)
CALCIUM BLD-MCNC: 8.5 MG/DL (ref 8.5–10.1)
CHLORIDE SERPL-SCNC: 113 MMOL/L (ref 98–112)
CO2 SERPL-SCNC: 22 MMOL/L (ref 21–32)
CREAT BLD-MCNC: 0.9 MG/DL (ref 0.55–1.02)
DEPRECATED RDW RBC AUTO: 43.1 FL (ref 35.1–46.3)
EOSINOPHIL # BLD AUTO: 0.08 X10(3) UL (ref 0–0.7)
EOSINOPHIL NFR BLD AUTO: 1.7 %
ERYTHROCYTE [DISTWIDTH] IN BLOOD BY AUTOMATED COUNT: 14 % (ref 11–15)
GLOBULIN PLAS-MCNC: 3.7 G/DL (ref 2.8–4.4)
GLUCOSE BLD-MCNC: 100 MG/DL (ref 70–99)
HCT VFR BLD AUTO: 36.1 % (ref 35–48)
HGB BLD-MCNC: 11.7 G/DL (ref 12–16)
IMM GRANULOCYTES # BLD AUTO: 0.02 X10(3) UL (ref 0–1)
IMM GRANULOCYTES NFR BLD: 0.4 %
LYMPHOCYTES # BLD AUTO: 1.78 X10(3) UL (ref 1–4)
LYMPHOCYTES NFR BLD AUTO: 37.6 %
M PROTEIN MFR SERPL ELPH: 7.2 G/DL (ref 6.4–8.2)
MCH RBC QN AUTO: 27.1 PG (ref 26–34)
MCHC RBC AUTO-ENTMCNC: 32.4 G/DL (ref 31–37)
MCV RBC AUTO: 83.8 FL (ref 80–100)
MONOCYTES # BLD AUTO: 0.33 X10(3) UL (ref 0.1–1)
MONOCYTES NFR BLD AUTO: 7 %
NEUTROPHILS # BLD AUTO: 2.47 X10 (3) UL (ref 1.5–7.7)
NEUTROPHILS # BLD AUTO: 2.47 X10(3) UL (ref 1.5–7.7)
NEUTROPHILS NFR BLD AUTO: 52 %
OSMOLALITY SERPL CALC.SUM OF ELEC: 294 MOSM/KG (ref 275–295)
PATIENT FASTING Y/N/NP: NO
PLATELET # BLD AUTO: 307 10(3)UL (ref 150–450)
POTASSIUM SERPL-SCNC: 4 MMOL/L (ref 3.5–5.1)
RBC # BLD AUTO: 4.31 X10(6)UL (ref 3.8–5.3)
SODIUM SERPL-SCNC: 141 MMOL/L (ref 136–145)
WBC # BLD AUTO: 4.7 X10(3) UL (ref 4–11)

## 2020-07-31 PROCEDURE — 86769 SARS-COV-2 COVID-19 ANTIBODY: CPT

## 2020-07-31 PROCEDURE — 85025 COMPLETE CBC W/AUTO DIFF WBC: CPT

## 2020-07-31 PROCEDURE — 36415 COLL VENOUS BLD VENIPUNCTURE: CPT

## 2020-07-31 PROCEDURE — 80053 COMPREHEN METABOLIC PANEL: CPT

## 2020-08-01 LAB — SARS-COV-2 IGG SERPLBLD QL IA.RAPID: NEGATIVE

## 2020-08-14 ENCOUNTER — TELEPHONE (OUTPATIENT)
Dept: FAMILY MEDICINE CLINIC | Facility: CLINIC | Age: 50
End: 2020-08-14

## 2020-08-14 NOTE — TELEPHONE ENCOUNTER
Patient calling for results of antibody test and chest x-ray. All results and recommendations reviewed. Patient verbalizes understanding, denies further questions and agrees with plan of care.

## 2020-09-11 ENCOUNTER — NURSE TRIAGE (OUTPATIENT)
Dept: FAMILY MEDICINE CLINIC | Facility: CLINIC | Age: 50
End: 2020-09-11

## 2020-09-11 DIAGNOSIS — M25.512 LEFT SHOULDER PAIN, UNSPECIFIED CHRONICITY: Primary | ICD-10-CM

## 2020-09-11 NOTE — TELEPHONE ENCOUNTER
Patient states she has bulging disks in her neck and back, and believes this is why she's experiencing intermittent numbness and tingling over her left scapula for the last week and a half.     Patient requesting another referral for PT for her left shoulde

## 2020-09-14 NOTE — TELEPHONE ENCOUNTER
Patient called, and given Dr. Guero Prather message.  Advised she can go to [employer] website and look up neurologists and because she has PPO she does not require a referral    Patient verbalized understanding

## 2020-09-14 NOTE — TELEPHONE ENCOUNTER
Patient will be starting physical therapy for her shoulder this week     Patient states she has seen Dr. Asael Lima and Petey Cotto in the past and would not like to return to either of them    Please advise on a neurologist that you recommend patient to see.

## 2020-09-15 ENCOUNTER — OFFICE VISIT (OUTPATIENT)
Dept: PHYSICAL THERAPY | Age: 50
End: 2020-09-15
Attending: FAMILY MEDICINE
Payer: COMMERCIAL

## 2020-09-15 ENCOUNTER — MED REC SCAN ONLY (OUTPATIENT)
Dept: FAMILY MEDICINE CLINIC | Facility: CLINIC | Age: 50
End: 2020-09-15

## 2020-09-15 DIAGNOSIS — M25.511 ACUTE PAIN OF RIGHT SHOULDER: ICD-10-CM

## 2020-09-15 PROCEDURE — 97110 THERAPEUTIC EXERCISES: CPT

## 2020-09-15 PROCEDURE — 97161 PT EVAL LOW COMPLEX 20 MIN: CPT

## 2020-09-15 NOTE — PROGRESS NOTES
P.T. EVALUATION:   Referring Physician: Dr. Alisa Henriquez  Diagnosis: Left shoulder pain, unspecified chronicity (M25.512)  Acute pain of right shoulder (M25.511)     Date of Onset: July 2020 Date of Service: 9/15/2020     PATIENT SUMMARY   Sunitha kirby Sangeetha Ambrocio would benefit from skilled Physical Therapy to address the above impairments to relieve pain.     Precautions:  None     OBJECTIVE:   Observation: pt ambulates independently; R shoulder hiking noted with B shoulder abduction ROM    AROM:   [facility]ervica as soon as possible to [phone].  If you have any questions, please contact me at Dept: [phone]    Sincerely,  Electronically signed by therapist: Umang Baez PT, DPT    [de-identified] certification required: Yes  I certify the need for these servic

## 2020-09-18 ENCOUNTER — OFFICE VISIT (OUTPATIENT)
Dept: PHYSICAL THERAPY | Age: 50
End: 2020-09-18
Attending: FAMILY MEDICINE
Payer: COMMERCIAL

## 2020-09-18 PROCEDURE — 97110 THERAPEUTIC EXERCISES: CPT

## 2020-09-18 NOTE — PROGRESS NOTES
Diagnosis:  Left shoulder pain, unspecified chronicity (M25.512)  Acute pain of right shoulder (M25.511)           Next MD visit: none scheduled  Fall Risk: standard         Precautions: n/a          Medication Changes since last visit?: No    Subjective:

## 2020-09-20 DIAGNOSIS — E78.00 HYPERCHOLESTEREMIA: ICD-10-CM

## 2020-09-21 RX ORDER — ATORVASTATIN CALCIUM 20 MG/1
TABLET, FILM COATED ORAL
Qty: 90 TABLET | Refills: 1 | Status: SHIPPED | OUTPATIENT
Start: 2020-09-21 | End: 2020-10-06

## 2020-09-22 ENCOUNTER — OFFICE VISIT (OUTPATIENT)
Dept: PHYSICAL THERAPY | Age: 50
End: 2020-09-22
Attending: FAMILY MEDICINE
Payer: COMMERCIAL

## 2020-09-22 PROCEDURE — 97110 THERAPEUTIC EXERCISES: CPT

## 2020-09-22 NOTE — PROGRESS NOTES
Diagnosis:  Left shoulder pain, unspecified chronicity (M25.512)  Acute pain of right shoulder (M25.511)           Next MD visit: none scheduled  Fall Risk: standard         Precautions: n/a          Medication Changes since last visit?: No    Subjective: 2x10  - supine B shoulder flexion with wand 2x10  - sidelying R shoulder ER 1x12  - standing B shoulder extension with wand 2x10  - standing B scap rows with GSC 2x10  - standing B shoulder extension with Vabad[address] 41 2x10  -    Manual Therapy    - AP GH joint mobs

## 2020-09-25 ENCOUNTER — OFFICE VISIT (OUTPATIENT)
Dept: PHYSICAL THERAPY | Age: 50
End: 2020-09-25
Attending: FAMILY MEDICINE
Payer: COMMERCIAL

## 2020-09-25 PROCEDURE — 97110 THERAPEUTIC EXERCISES: CPT

## 2020-09-25 NOTE — PROGRESS NOTES
Diagnosis:  Left shoulder pain, unspecified chronicity (M25.512)  Acute pain of right shoulder (M25.511)           Next MD visit: none scheduled  Fall Risk: standard         Precautions: n/a          Medication Changes since last visit?: No    Subjective: supine C-retraction over EOB 3 x 10  - supine c-retraction with towel under occiput 2x10  - supine R/L c-rotation 15x  - supine c-retraction with self OP with towel under occiput 2x10  - supine B shoulder flexion rhythmic stabilization CW/CCW 2# DB 2 x 20

## 2020-09-29 ENCOUNTER — OFFICE VISIT (OUTPATIENT)
Dept: PHYSICAL THERAPY | Age: 50
End: 2020-09-29
Attending: FAMILY MEDICINE
Payer: COMMERCIAL

## 2020-09-29 PROCEDURE — 97110 THERAPEUTIC EXERCISES: CPT

## 2020-09-29 NOTE — PROGRESS NOTES
Diagnosis:  Left shoulder pain, unspecified chronicity (M25.512)  Acute pain of right shoulder (M25.511)           Next MD visit: none scheduled  Fall Risk: standard         Precautions: n/a          Medication Changes since last visit?: No    Subjective: DB  - standing B shoulder extension with wand 3 x 10  - standing B scap rows with GSC 3 x 10  - standing B shoulder extension with Vabad[address] 41 3 x 10  - standing B horizontal abduction with Vabad[address] 41 2 x 10  - standing R shoulder ER with YTB 3 x 10   - standing B should reps with no increased pain noted to address deficits. PT and patient goals are in progress.     Goals:    1- Pt will be I with maintenance and progression of HEP  2- Pt will report 1/10 pain or less with ADLs  3- Pt will demo increase in BUE strength to 5/

## 2020-10-02 ENCOUNTER — OFFICE VISIT (OUTPATIENT)
Dept: PHYSICAL THERAPY | Age: 50
End: 2020-10-02
Attending: FAMILY MEDICINE
Payer: COMMERCIAL

## 2020-10-02 PROCEDURE — 97110 THERAPEUTIC EXERCISES: CPT

## 2020-10-02 NOTE — PROGRESS NOTES
Diagnosis:  Left shoulder pain, unspecified chronicity (M25.512)  Acute pain of right shoulder (M25.511)           Next MD visit: none scheduled  Fall Risk: standard         Precautions: n/a          Medication Changes since last visit?: No    Subjective: extension with Vabad[address] 41 3 x 10  - standing B shoulder scaption 2# DB 2 x 10   - supine c-retraction with self OP with towel under occiput 2x10  - supine B shoulder flexion rhythmic stabilization CW/CCW 2# DB 2 x 20 each  - supine B shoulder flexion OH 2# DB 2 x towel under occiput 2x10  - supine B shoulder flexion with wand 2x10  - sidelying R shoulder ER 1x12  - standing B shoulder extension with wand 2x10  - standing B scap rows with GSC 2x10  - standing B shoulder extension with Vabad[address] 41 2x10  -    Manual Therapy

## 2020-10-05 ENCOUNTER — LAB ENCOUNTER (OUTPATIENT)
Dept: LAB | Age: 50
End: 2020-10-05
Attending: FAMILY MEDICINE
Payer: COMMERCIAL

## 2020-10-05 DIAGNOSIS — E78.00 HYPERCHOLESTEREMIA: ICD-10-CM

## 2020-10-05 DIAGNOSIS — E03.9 HYPOTHYROIDISM, UNSPECIFIED TYPE: ICD-10-CM

## 2020-10-05 DIAGNOSIS — E03.9 HYPOTHYROIDISM (ACQUIRED): ICD-10-CM

## 2020-10-05 PROCEDURE — 84443 ASSAY THYROID STIM HORMONE: CPT | Performed by: FAMILY MEDICINE

## 2020-10-05 PROCEDURE — 80061 LIPID PANEL: CPT | Performed by: FAMILY MEDICINE

## 2020-10-05 PROCEDURE — 80053 COMPREHEN METABOLIC PANEL: CPT

## 2020-10-05 PROCEDURE — 36415 COLL VENOUS BLD VENIPUNCTURE: CPT | Performed by: FAMILY MEDICINE

## 2020-10-05 PROCEDURE — 84439 ASSAY OF FREE THYROXINE: CPT | Performed by: FAMILY MEDICINE

## 2020-10-06 ENCOUNTER — OFFICE VISIT (OUTPATIENT)
Dept: PHYSICAL THERAPY | Age: 50
End: 2020-10-06
Attending: FAMILY MEDICINE
Payer: COMMERCIAL

## 2020-10-06 DIAGNOSIS — E78.00 HYPERCHOLESTEREMIA: ICD-10-CM

## 2020-10-06 DIAGNOSIS — E03.9 HYPOTHYROIDISM, UNSPECIFIED TYPE: ICD-10-CM

## 2020-10-06 PROCEDURE — 97110 THERAPEUTIC EXERCISES: CPT

## 2020-10-06 RX ORDER — LEVOTHYROXINE SODIUM 88 UG/1
88 TABLET ORAL
Qty: 90 TABLET | Refills: 3 | Status: SHIPPED | OUTPATIENT
Start: 2020-10-06

## 2020-10-06 RX ORDER — ATORVASTATIN CALCIUM 20 MG/1
20 TABLET, FILM COATED ORAL DAILY
Qty: 90 TABLET | Refills: 3 | Status: SHIPPED | OUTPATIENT
Start: 2020-10-06 | End: 2022-01-19

## 2020-10-06 NOTE — PROGRESS NOTES
Diagnosis:  Left shoulder pain, unspecified chronicity (M25.512)  Acute pain of right shoulder (M25.511)           Next MD visit: none scheduled  Fall Risk: standard         Precautions: n/a          Medication Changes since last visit?: No    Subjective: CW/CCW 3# DB 2 x 20 each  - sidelying R scaption 3 x 10 AROM with 2# DB  - sidelying R shoulder ER 2# DB 3 x 10  - standing B shoulder ext with nyla 20# 3 x 10  - standing B scap rows with rope 20# 3 x 10  - standing B shoulder ER with Vabaduse 41 2 x 10  - supine shoulder extension with Vabad[address] 41 3 x 10  - standing R shoulder ER with YTB 2 x 10   - supine C-retraction over EOB 3 x 10  - supine c-retraction with towel under occiput 2x10  - supine R/L c-rotation 15x  - supine c-retraction with self OP with towel under occi

## 2020-10-09 ENCOUNTER — OFFICE VISIT (OUTPATIENT)
Dept: PHYSICAL THERAPY | Age: 50
End: 2020-10-09
Attending: FAMILY MEDICINE
Payer: COMMERCIAL

## 2020-10-09 PROCEDURE — 97110 THERAPEUTIC EXERCISES: CPT

## 2020-10-13 ENCOUNTER — OFFICE VISIT (OUTPATIENT)
Dept: PHYSICAL THERAPY | Age: 50
End: 2020-10-13
Attending: FAMILY MEDICINE
Payer: COMMERCIAL

## 2020-10-13 PROCEDURE — 97110 THERAPEUTIC EXERCISES: CPT

## 2020-10-13 NOTE — PROGRESS NOTES
Diagnosis:  Left shoulder pain, unspecified chronicity (M25.512)  Acute pain of right shoulder (M25.511)           Next MD visit: none scheduled  Fall Risk: standard         Precautions: n/a          Medication Changes since last visit?: No    Subjective: supine c-retraction with self OP with towel under occiput 2 x 10  - sidelying R shoulder ER 2# DB 3 x 10  - standing B shoulder ext with nyla 20# 3 x 10  - standing B scap rows with rope 20# 3 x 10 - standing B shoulder extension with stick 2 x 20 AAROM  - standing B shoulder scaption 2# DB 2 x 10   - supine c-retraction with self OP with towel under occiput 2x10  - supine B shoulder flexion rhythmic stabilization CW/CCW 2# DB 2 x 20 each  - supine B shoulder flexion OH 2# DB 2 x 10  - supine B horizontal ab supine B shoulder flexion with wand 2x10  - sidelying R shoulder ER 1x12  - standing B shoulder extension with wand 2x10  - standing B scap rows with GSC 2x10  - standing B shoulder extension with Vabaduse 41 2x10  -    Manual Therapy          - AP GH joint mobs t

## 2020-10-15 ENCOUNTER — OFFICE VISIT (OUTPATIENT)
Dept: PHYSICAL THERAPY | Age: 50
End: 2020-10-15
Attending: FAMILY MEDICINE
Payer: COMMERCIAL

## 2020-10-15 PROCEDURE — 97110 THERAPEUTIC EXERCISES: CPT

## 2020-10-15 NOTE — PROGRESS NOTES
Diagnosis:  Left shoulder pain, unspecified chronicity (M25.512)  Acute pain of right shoulder (M25.511)           Next MD visit: none scheduled  Fall Risk: standard         Precautions: n/a          Medication Changes since last visit?: No    Subjective: shoulder ext with nyla 20# 3 x 10  - standing B scap rows with rope 20# 3 x 10  - standing B serratus punches with GSC 2x10 - standing B shoulder extension with stick 2 x 20 AAROM  - standing B shoulder IR behind back AROM x 20  - reassess strength and cerv flexion rhythmic stabilization CW/CCW 2# DB 2 x 20 each  - supine B shoulder flexion OH 2# DB 2 x 10  - supine B horizontal abd/adduction 2# DB 2 x 10  - sidelying R scaption 2 x 10 AROM with 2# DB  - sidelying R shoulder ER 2# 3 x 10  - standing B shoulde abd/adduction 1# DB 2 x 10  - sidelying R shoulder ER 3 x 10 AROM  - sidelying R scaption 2 x 10 AROM  - standing B shoulder extension with wand 2x10  - standing B scap rows with GSC 2 x 10  - standing B shoulder extension with Vabaduse 41 2 x 10  - standing R farideh

## 2020-12-09 ENCOUNTER — TELEPHONE (OUTPATIENT)
Dept: FAMILY MEDICINE CLINIC | Facility: CLINIC | Age: 50
End: 2020-12-09

## 2020-12-09 DIAGNOSIS — E03.9 HYPOTHYROIDISM, UNSPECIFIED TYPE: ICD-10-CM

## 2020-12-09 DIAGNOSIS — R68.89 COLD INTOLERANCE: Primary | ICD-10-CM

## 2020-12-09 NOTE — TELEPHONE ENCOUNTER
Verified name and . Patient reports that she has had anemia in the past. She is requesting a CBC blood test to check for anemia as she states she has been \"feeling cold and freezing all of the time. \"    Please advise and thank you.

## 2020-12-09 NOTE — TELEPHONE ENCOUNTER
I ordered blood counts as well as iron levels. Thyroid levels were just checked in October and were fine.   Please inform patient

## 2020-12-09 NOTE — TELEPHONE ENCOUNTER
Left detailed message per WAN along with call back number if patient has any questions. MyChart message sent.

## 2020-12-22 ENCOUNTER — ORDER TRANSCRIPTION (OUTPATIENT)
Dept: PHYSICAL THERAPY | Facility: HOSPITAL | Age: 50
End: 2020-12-22

## 2020-12-22 DIAGNOSIS — M51.9 LUMBAR DISC DISEASE: Primary | ICD-10-CM

## 2020-12-24 ENCOUNTER — LAB ENCOUNTER (OUTPATIENT)
Dept: LAB | Age: 50
End: 2020-12-24
Attending: FAMILY MEDICINE
Payer: COMMERCIAL

## 2020-12-24 DIAGNOSIS — R68.89 COLD INTOLERANCE: ICD-10-CM

## 2020-12-24 PROCEDURE — 83540 ASSAY OF IRON: CPT

## 2020-12-24 PROCEDURE — 36415 COLL VENOUS BLD VENIPUNCTURE: CPT

## 2020-12-24 PROCEDURE — 85025 COMPLETE CBC W/AUTO DIFF WBC: CPT

## 2020-12-24 PROCEDURE — 84466 ASSAY OF TRANSFERRIN: CPT

## 2021-01-02 DIAGNOSIS — D50.9 IRON DEFICIENCY ANEMIA, UNSPECIFIED IRON DEFICIENCY ANEMIA TYPE: Primary | ICD-10-CM

## 2021-01-02 RX ORDER — FERROUS SULFATE 325(65) MG
325 TABLET ORAL
Qty: 90 TABLET | Refills: 0 | Status: SHIPPED | OUTPATIENT
Start: 2021-01-02 | End: 2021-05-20

## 2021-01-07 ENCOUNTER — TELEPHONE (OUTPATIENT)
Dept: FAMILY MEDICINE CLINIC | Facility: CLINIC | Age: 51
End: 2021-01-07

## 2021-01-07 DIAGNOSIS — Z12.31 ENCOUNTER FOR SCREENING MAMMOGRAM FOR BREAST CANCER: Primary | ICD-10-CM

## 2021-01-07 NOTE — TELEPHONE ENCOUNTER
Discussed results with patient. She verbalized understanding and will complete stool study. Denies blood in stool or black stool.     She also mentioned her last colonoscopy was in 2018, her results were normal and she was informed her next colonoscopy

## 2021-02-02 ENCOUNTER — TELEPHONE (OUTPATIENT)
Dept: FAMILY MEDICINE CLINIC | Facility: CLINIC | Age: 51
End: 2021-02-02

## 2021-02-02 DIAGNOSIS — D50.8 OTHER IRON DEFICIENCY ANEMIA: Primary | ICD-10-CM

## 2021-02-02 NOTE — TELEPHONE ENCOUNTER
Patient not able to take iron orally I would have her see hematology for the iron deficiency  They have IV options  Also please have patient at least do the stool test if she is not interested in seeing gastroenterology at this time

## 2021-02-02 NOTE — TELEPHONE ENCOUNTER
Verified name and . Patient was advised of Dr. Yaneli De La Rosa message as seen in previous charting note. She verbalized understanding and agrees with plan.  Referral information given:     Referred to Provider Information:  Provider Address Phone   Benedict Jimenez

## 2021-02-02 NOTE — TELEPHONE ENCOUNTER
Dr. Murphy Rosales: please review and advise further. Patient feels ferrous sulfate iron pills causing symptoms. Has explosive diarrhea after a week since starting iron pills. Has been gassier. She stopped a week ago.  Stools are getting better, stools are for

## 2021-02-09 ENCOUNTER — OFFICE VISIT (OUTPATIENT)
Dept: HEMATOLOGY/ONCOLOGY | Facility: HOSPITAL | Age: 51
End: 2021-02-09
Attending: INTERNAL MEDICINE
Payer: COMMERCIAL

## 2021-02-09 VITALS
HEART RATE: 85 BPM | HEIGHT: 67 IN | OXYGEN SATURATION: 98 % | DIASTOLIC BLOOD PRESSURE: 82 MMHG | TEMPERATURE: 98 F | BODY MASS INDEX: 30.61 KG/M2 | SYSTOLIC BLOOD PRESSURE: 135 MMHG | RESPIRATION RATE: 16 BRPM | WEIGHT: 195 LBS

## 2021-02-09 DIAGNOSIS — D50.8 OTHER IRON DEFICIENCY ANEMIA: Primary | ICD-10-CM

## 2021-02-09 PROCEDURE — 99244 OFF/OP CNSLTJ NEW/EST MOD 40: CPT | Performed by: INTERNAL MEDICINE

## 2021-02-09 NOTE — PROGRESS NOTES
Hematology Consultation Note    Patient Name: Juan Francisco Shields   YOB: 1970   Medical Record Number: [id number]   CSN: [id number]   Consulting Physician: Jas Alford MD  Referring Physician(s): Kelin Hussein  Date of Consultation: 2/9/2021 06/2010    cervical lami C6-7, fusion with titanium cage, no bleeding complications   • D & C  2002   • OTHER SURGICAL HISTORY  2004    bunionectomy bilat       Family Medical History:  Family History   Problem Relation Age of Onset   • Hypertension Father •  Rizatriptan Benzoate (MAXALT) 10 MG Oral Tab, Take 1 tablet (10 mg total) by mouth as needed for Migraine. , Disp: 9 tablet, Rfl: 11    •  Lysine HCl 1000 MG Oral Tab, Take 1 tablet by mouth., Disp: , Rfl:     •  aspirin 81 MG Oral Tab, Take 81 mg by rales.  Heart: Regular rate and rhythm. S1S2 normal.  Abdomen: Soft, non tender with good bowel sounds. No hepatosplenomegaly. No palpable mass. Extremities: No edema or calf tenderness. Neurological: Grossly intact.       Labs:    Lab Results   Compone patient to continue plan follow-up with GI for her abdominal discomfort and diarrhea symptoms as she previously had microscopic colitis was recommended for budesonide steroid therapy    --Oral iron replacement therapy does not appear to be causing her abov

## 2021-02-21 ENCOUNTER — APPOINTMENT (OUTPATIENT)
Dept: GENERAL RADIOLOGY | Age: 51
End: 2021-02-21
Attending: NURSE PRACTITIONER
Payer: COMMERCIAL

## 2021-02-21 ENCOUNTER — HOSPITAL ENCOUNTER (OUTPATIENT)
Age: 51
Discharge: HOME | End: 2021-02-21
Payer: COMMERCIAL

## 2021-02-21 VITALS
WEIGHT: 185 LBS | OXYGEN SATURATION: 99 % | RESPIRATION RATE: 18 BRPM | SYSTOLIC BLOOD PRESSURE: 144 MMHG | DIASTOLIC BLOOD PRESSURE: 83 MMHG | HEART RATE: 85 BPM | BODY MASS INDEX: 29.03 KG/M2 | TEMPERATURE: 97 F | HEIGHT: 67 IN
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
## 2021-02-21 DIAGNOSIS — S83.8X1A SPRAIN OF OTHER LIGAMENT OF RIGHT KNEE, INITIAL ENCOUNTER: ICD-10-CM

## 2021-02-21 DIAGNOSIS — S93.601A SPRAIN OF RIGHT FOOT, INITIAL ENCOUNTER: ICD-10-CM

## 2021-02-21 PROCEDURE — 73560 X-RAY EXAM OF KNEE 1 OR 2: CPT | Performed by: NURSE PRACTITIONER

## 2021-02-21 PROCEDURE — 73630 X-RAY EXAM OF FOOT: CPT | Performed by: NURSE PRACTITIONER

## 2021-02-21 PROCEDURE — 99213 OFFICE O/P EST LOW 20 MIN: CPT | Performed by: NURSE PRACTITIONER

## 2021-02-21 NOTE — ED INITIAL ASSESSMENT (HPI)
Julia Shin last Thursday and injured rt leg with pain and bruising dorsal foot and increased pain in left knee/and back of calf. Shuffling walk noted.

## 2021-02-21 NOTE — ED PROVIDER NOTES
Patient presents with:  Fall  Leg or Foot Injury      HPI:     Alisa Tran is a 46year old female who presents today for right foot and right knee pain after a fall that occurred 2 days ago.   The patient states that she was walking her dog, slippe Family history reviewed with patient/caregiver and is not pertinent to presenting problem.   Social History    Socioeconomic History      Marital status: Single      Spouse name: Not on file      Number of children: 0      Years of education: Not on beck Weight Concern: Not Asked        Special Diet: Not Asked        Back Care: Not Asked        Exercise: No        Bike Helmet: Not Asked        Seat Belt: Not Asked        Self-Exams: Not Asked    Social History Narrative      Lars Sagastume is single.  She has no child increased pain in left knee/and back of calf. Shuffling walk noted.                         Order Specific Question: What is the Relevant Clinical Indication / Reason for Exam?          Answer: injured right foot and knee from fall      XR KNEE (1 OR 2 VIEW W19.XXXA XR FOOT, COMPLETE (MIN 3 VIEWS), RIGHT (CPT=73630)     XR KNEE (1 OR 2 VIEWS), RIGHT (CPT=73560)     XR FOOT, COMPLETE (MIN 3 VIEWS), RIGHT (CPT=73630)     XR KNEE (1 OR 2 VIEWS), RIGHT (CPT=73560)   2. Sprain of other ligament of right knee, init

## 2021-04-12 NOTE — PROGRESS NOTES
[facility] Follow-up Visit    Dora sedation - patient reports decreased respirations during prior anesthesia but is unable to provide additional detail or documentation related to this  - No known history of sleep apnea.     Pertinent Family Hx:  - No family hx of esophageal, gastric or col • Heart Disorder Paternal Uncle 72         pacemaker, PVD   • Cancer Paternal Uncle 80        unknown type   • Bleeding Disorders Neg    • Clotting Disorder Neg       Social History: Social History    Tobacco Use      Smoking status: Former Smoker by mouth daily.  (Patient not taking: Reported on 4/26/2021 )         Allergies:    Cortisporin [Neomyc*    HIVES  Codeine [Opioid Evelyn*    ITCHING  Vicodin [Hydrocodon*    ITCHING  Vosol Hc [Hydrocort*    ITCHING    ROS:   CONSTITUTIONAL:  negative for feve hepatic steatosis, hemangioma, KAYLEE, depression, migraine headaches, hypothyroid, hyperlipidemia, who presents for evaluation of irregular bowel habits        1. Irregular bowel habits/abdominal cramping/KAYLEE:  The patient has a known history of irregular jordan of care, documentation.         Recommend:  -Complete stool test  -FODMAP diet  -Restart probiotics and fiber supplement  -Avoid dairy/gluten products  -Consider dicyclomine pending lab work  -Consider trial of Xifaxan pending the above      Orders This [employer]

## 2021-04-26 ENCOUNTER — OFFICE VISIT (OUTPATIENT)
Dept: GASTROENTEROLOGY | Facility: CLINIC | Age: 51
End: 2021-04-26
Payer: COMMERCIAL

## 2021-04-26 VITALS
WEIGHT: 187 LBS | SYSTOLIC BLOOD PRESSURE: 138 MMHG | HEART RATE: 80 BPM | HEIGHT: 67 IN | BODY MASS INDEX: 29.35 KG/M2 | DIASTOLIC BLOOD PRESSURE: 90 MMHG

## 2021-04-26 DIAGNOSIS — R19.8 IRREGULAR BOWEL HABITS: Primary | ICD-10-CM

## 2021-04-26 DIAGNOSIS — D50.9 IRON DEFICIENCY ANEMIA, UNSPECIFIED IRON DEFICIENCY ANEMIA TYPE: ICD-10-CM

## 2021-04-26 DIAGNOSIS — R10.9 ABDOMINAL CRAMPING: ICD-10-CM

## 2021-04-26 PROCEDURE — 99214 OFFICE O/P EST MOD 30 MIN: CPT | Performed by: NURSE PRACTITIONER

## 2021-04-26 PROCEDURE — 3008F BODY MASS INDEX DOCD: CPT | Performed by: NURSE PRACTITIONER

## 2021-04-26 PROCEDURE — 3075F SYST BP GE 130 - 139MM HG: CPT | Performed by: NURSE PRACTITIONER

## 2021-04-26 PROCEDURE — 3080F DIAST BP >= 90 MM HG: CPT | Performed by: NURSE PRACTITIONER

## 2021-04-26 RX ORDER — ERENUMAB-AOOE 70 MG/ML
INJECTION SUBCUTANEOUS
COMMUNITY
Start: 2021-01-29

## 2021-04-26 NOTE — PATIENT INSTRUCTIONS
-Complete stool test  -FODMAP diet - [email or web address]  -Restart probiotics and fiber supplement  -Avoid dairy/gluten products

## 2021-05-11 ENCOUNTER — OFFICE VISIT (OUTPATIENT)
Dept: HEMATOLOGY/ONCOLOGY | Facility: HOSPITAL | Age: 51
End: 2021-05-11
Attending: INTERNAL MEDICINE
Payer: COMMERCIAL

## 2021-05-11 VITALS
RESPIRATION RATE: 16 BRPM | BODY MASS INDEX: 29.98 KG/M2 | HEART RATE: 75 BPM | SYSTOLIC BLOOD PRESSURE: 128 MMHG | DIASTOLIC BLOOD PRESSURE: 79 MMHG | HEIGHT: 67 IN | OXYGEN SATURATION: 97 % | WEIGHT: 191 LBS | TEMPERATURE: 97 F

## 2021-05-11 DIAGNOSIS — K52.839 MICROSCOPIC COLITIS, UNSPECIFIED MICROSCOPIC COLITIS TYPE: Primary | ICD-10-CM

## 2021-05-11 DIAGNOSIS — D50.8 OTHER IRON DEFICIENCY ANEMIA: ICD-10-CM

## 2021-05-11 PROCEDURE — 99214 OFFICE O/P EST MOD 30 MIN: CPT | Performed by: INTERNAL MEDICINE

## 2021-05-11 NOTE — PROGRESS NOTES
Hematology Progress Note    Patient Name: Alecia Gupta   YOB: 1970   Medical Record Number: [id number]   CSN: [id number]   Consulting Physician: Ganesh Noland MD  Referring Physician(s): Susanna Caballero  Date of Visit: 5/11/2021     Chief Diagnosis Date   • Adrenal adenoma, left    • ANEMIA     iron deficiency since being a teenager   • Depression     sees Dr. Marquette Heimlich   • Hearing loss 04/24/2020    pt has bilat hearing aids   • Herpes labialis    • HNP (herniated nucleus pulposus), cervical (LEXAPRO) 20 MG Oral Tab, Take 1 tablet (20 mg total) by mouth every morning., Disp: 90 tablet, Rfl: 0  Ferrous Sulfate 325 (65 Fe) MG Oral Tab, Take 1 tablet (325 mg total) by mouth daily with breakfast., Disp: 90 tablet, Rfl: 0  atorvastatin 20 MG Oral T Signs:   /79 (BP Location: Left arm, Patient Position: Sitting, Cuff Size: large)   Pulse 75   Temp 97.3 °F (36.3 °C) (Oral)   Resp 16   Ht 1.702 m (5' 7\")   Wt 86.6 kg (191 lb)   LMP 02/14/2021   SpO2 97%   BMI 29.91 kg/m²     Physical Examination: HAPTOGLOBIN, FOLIC ACID SERUM(FOLATE), VITAMIN         B12, RETICULOCYTE COUNT, LDH, HAPTOGLOBIN    46year old W with PMH most relevant for microscopic colitis seen on colonoscopy in December 2019 by Dr. Shelly Sanchez presenting for iron deficiency anemia levels    --I would rec PRBC transfusion at this time unless hgb values is < 7g/dL    --I have asked her to repeat a CBC with differential and iron studies today.      --Patient will RTC in 3 months for reassessment    MDM: Moderate Risk    CARLYLE Estrada

## 2021-05-19 DIAGNOSIS — D50.9 IRON DEFICIENCY ANEMIA, UNSPECIFIED IRON DEFICIENCY ANEMIA TYPE: ICD-10-CM

## 2021-05-20 RX ORDER — LIDOCAINE HYDROCHLORIDE 20 MG/ML
SOLUTION ORAL; TOPICAL
Qty: 90 TABLET | Refills: 0 | Status: SHIPPED | OUTPATIENT
Start: 2021-05-20 | End: 2021-09-07

## 2021-06-23 RX ORDER — VALACYCLOVIR HYDROCHLORIDE 1 G/1
TABLET, FILM COATED ORAL
Qty: 4 TABLET | Refills: 2 | Status: SHIPPED | OUTPATIENT
Start: 2021-06-23

## 2021-08-10 ENCOUNTER — LAB ENCOUNTER (OUTPATIENT)
Dept: LAB | Facility: HOSPITAL | Age: 51
End: 2021-08-10
Attending: INTERNAL MEDICINE
Payer: COMMERCIAL

## 2021-08-10 DIAGNOSIS — D50.8 OTHER IRON DEFICIENCY ANEMIA: ICD-10-CM

## 2021-08-10 LAB
BASOPHILS # BLD AUTO: 0.05 X10(3) UL (ref 0–0.2)
BASOPHILS NFR BLD AUTO: 1.2 %
DEPRECATED HBV CORE AB SER IA-ACNC: 21.4 NG/ML
DEPRECATED RDW RBC AUTO: 40.9 FL (ref 35.1–46.3)
EOSINOPHIL # BLD AUTO: 0.05 X10(3) UL (ref 0–0.7)
EOSINOPHIL NFR BLD AUTO: 1.2 %
ERYTHROCYTE [DISTWIDTH] IN BLOOD BY AUTOMATED COUNT: 12.4 % (ref 11–15)
HCT VFR BLD AUTO: 40.5 %
HGB BLD-MCNC: 13.4 G/DL
IMM GRANULOCYTES # BLD AUTO: 0.01 X10(3) UL (ref 0–1)
IMM GRANULOCYTES NFR BLD: 0.2 %
IRON SATURATION: 24 %
IRON SERPL-MCNC: 94 UG/DL
LYMPHOCYTES # BLD AUTO: 1.44 X10(3) UL (ref 1–4)
LYMPHOCYTES NFR BLD AUTO: 34.8 %
MCH RBC QN AUTO: 29.5 PG (ref 26–34)
MCHC RBC AUTO-ENTMCNC: 33.1 G/DL (ref 31–37)
MCV RBC AUTO: 89 FL
MONOCYTES # BLD AUTO: 0.4 X10(3) UL (ref 0.1–1)
MONOCYTES NFR BLD AUTO: 9.7 %
NEUTROPHILS # BLD AUTO: 2.19 X10 (3) UL (ref 1.5–7.7)
NEUTROPHILS # BLD AUTO: 2.19 X10(3) UL (ref 1.5–7.7)
NEUTROPHILS NFR BLD AUTO: 52.9 %
PLATELET # BLD AUTO: 257 10(3)UL (ref 150–450)
RBC # BLD AUTO: 4.55 X10(6)UL
TOTAL IRON BINDING CAPACITY: 395 UG/DL (ref 240–450)
TRANSFERRIN SERPL-MCNC: 265 MG/DL (ref 200–360)
WBC # BLD AUTO: 4.1 X10(3) UL (ref 4–11)

## 2021-08-10 PROCEDURE — 82728 ASSAY OF FERRITIN: CPT

## 2021-08-10 PROCEDURE — 83540 ASSAY OF IRON: CPT

## 2021-08-10 PROCEDURE — 84466 ASSAY OF TRANSFERRIN: CPT

## 2021-08-10 PROCEDURE — 85025 COMPLETE CBC W/AUTO DIFF WBC: CPT

## 2021-08-10 PROCEDURE — 36415 COLL VENOUS BLD VENIPUNCTURE: CPT

## 2021-08-12 ENCOUNTER — APPOINTMENT (OUTPATIENT)
Dept: HEMATOLOGY/ONCOLOGY | Facility: HOSPITAL | Age: 51
End: 2021-08-12
Attending: INTERNAL MEDICINE
Payer: COMMERCIAL

## 2021-08-13 ENCOUNTER — OFFICE VISIT (OUTPATIENT)
Dept: HEMATOLOGY/ONCOLOGY | Facility: HOSPITAL | Age: 51
End: 2021-08-13
Attending: INTERNAL MEDICINE
Payer: COMMERCIAL

## 2021-08-13 VITALS
WEIGHT: 198 LBS | HEART RATE: 84 BPM | SYSTOLIC BLOOD PRESSURE: 144 MMHG | HEIGHT: 67.01 IN | DIASTOLIC BLOOD PRESSURE: 84 MMHG | TEMPERATURE: 98 F | OXYGEN SATURATION: 97 % | RESPIRATION RATE: 16 BRPM | BODY MASS INDEX: 31.08 KG/M2

## 2021-08-13 DIAGNOSIS — D50.8 OTHER IRON DEFICIENCY ANEMIA: Primary | ICD-10-CM

## 2021-08-13 PROCEDURE — 99213 OFFICE O/P EST LOW 20 MIN: CPT | Performed by: INTERNAL MEDICINE

## 2021-08-13 RX ORDER — AMOXICILLIN 250 MG
CAPSULE ORAL
COMMUNITY

## 2021-08-13 NOTE — PROGRESS NOTES
Hematology Progress Note    Patient Name: Bacilio Joiner   YOB: 1970   Medical Record Number: [id number]   CSN: [id number]   Consulting Physician: Leny Fowler MD  Referring Physician(s): Arnulfo Gutiérrez  Date of Visit: 8/13/2021     Chief Depression     sees Dr. Kalia Martin   • Hearing loss 04/24/2020    pt has bilat hearing aids   • Herpes labialis    • HNP (herniated nucleus pulposus), cervical     C7   • Hyperlipidemia 04/24/2020   • Hypothyroidism 04/24/2020   • Migraines 04/24/2020    Neurolog 0  VALACYCLOVIR HCL 1 G Oral Tab, TAKE 2 TABLETS(2000 MG) BY MOUTH TWICE DAILY FOR 1 DAY, Disp: 4 tablet, Rfl: 2  FEROSUL 325 (65 Fe) MG Oral Tab, TAKE 1 TABLET BY MOUTH DAILY WITH BREAKFAST, Disp: 90 tablet, Rfl: 0  AIMOVIG 70 MG/ML Subcutaneous, ADMINIST was completed. Pertinent positives and negatives noted in the the HPI. Vital Signs:  /84 (BP Location: Left arm, Patient Position: Sitting, Cuff Size: adult)   Pulse 84   Temp 98.2 °F (36.8 °C) (Oral)   Resp 16   Ht 1.702 m (5' 7.01\")   Wt 89. 8 Range: 50 - 170 ug/dL 94   Transferrin Latest Ref Range: 200 - 360 mg/dL 265   Iron Bind. Cap.(TIBC) Latest Ref Range: 240 - 450 ug/dL 395   Iron Saturation Latest Ref Range: 15 - 50 % 24   FERRITIN Latest Ref Range: 18.0 - 340.0 ng/mL 21.4     Impression: increase her dietary sources of iron.  I have given her a dietary source of iron rich foods that she can take to boost her iron levels    --I would rec PRBC transfusion at this time unless hgb values is < 7g/dL    --I have reviewed with the patient today he

## 2021-08-19 ENCOUNTER — LAB ENCOUNTER (OUTPATIENT)
Dept: LAB | Facility: HOSPITAL | Age: 51
End: 2021-08-19
Attending: INTERNAL MEDICINE
Payer: COMMERCIAL

## 2021-08-19 DIAGNOSIS — L65.9 HAIR LOSS: ICD-10-CM

## 2021-08-19 DIAGNOSIS — R63.5 WEIGHT GAIN, ABNORMAL: ICD-10-CM

## 2021-08-19 DIAGNOSIS — D35.00 ADRENAL ADENOMA: ICD-10-CM

## 2021-08-19 DIAGNOSIS — E61.1 IRON DEFICIENCY: ICD-10-CM

## 2021-08-19 DIAGNOSIS — R53.82 CHRONIC FATIGUE SYNDROME: ICD-10-CM

## 2021-08-19 DIAGNOSIS — E03.9 HYPOTHYROID: Primary | ICD-10-CM

## 2021-08-23 ENCOUNTER — LAB ENCOUNTER (OUTPATIENT)
Dept: LAB | Age: 51
End: 2021-08-23
Attending: INTERNAL MEDICINE
Payer: COMMERCIAL

## 2021-08-23 DIAGNOSIS — R63.5 WEIGHT GAIN, ABNORMAL: ICD-10-CM

## 2021-08-23 DIAGNOSIS — L65.9 HAIR LOSS: ICD-10-CM

## 2021-08-23 DIAGNOSIS — E61.1 IRON DEFICIENCY: ICD-10-CM

## 2021-08-23 DIAGNOSIS — R53.82 CHRONIC FATIGUE SYNDROME: ICD-10-CM

## 2021-08-23 DIAGNOSIS — R19.8 IRREGULAR BOWEL HABITS: ICD-10-CM

## 2021-08-23 DIAGNOSIS — R10.9 ABDOMINAL CRAMPING: ICD-10-CM

## 2021-08-23 DIAGNOSIS — E03.9 HYPOTHYROID: ICD-10-CM

## 2021-08-23 DIAGNOSIS — D35.00 ADRENAL ADENOMA: ICD-10-CM

## 2021-08-23 LAB
ALBUMIN SERPL-MCNC: 3.8 G/DL (ref 3.4–5)
ALBUMIN/GLOB SERPL: 1 {RATIO} (ref 1–2)
ALP LIVER SERPL-CCNC: 58 U/L
ALT SERPL-CCNC: 28 U/L
ANION GAP SERPL CALC-SCNC: 8 MMOL/L (ref 0–18)
AST SERPL-CCNC: 18 U/L (ref 15–37)
BILIRUB SERPL-MCNC: 0.4 MG/DL (ref 0.1–2)
BUN BLD-MCNC: 18 MG/DL (ref 7–18)
BUN/CREAT SERPL: 20.9 (ref 10–20)
CALCIUM BLD-MCNC: 8.9 MG/DL (ref 8.5–10.1)
CHLORIDE SERPL-SCNC: 108 MMOL/L (ref 98–112)
CO2 SERPL-SCNC: 21 MMOL/L (ref 21–32)
CREAT BLD-MCNC: 0.86 MG/DL
CRYPTOSP AG STL QL IA: NEGATIVE
DHEA-S SERPL-MCNC: 32.7 UG/DL
FSH SERPL-ACNC: 3.9 MIU/ML
G LAMBLIA AG STL QL IA: NEGATIVE
GLOBULIN PLAS-MCNC: 3.7 G/DL (ref 2.8–4.4)
GLUCOSE BLD-MCNC: 100 MG/DL (ref 70–99)
LH SERPL-ACNC: 2.2 MIU/ML
M PROTEIN MFR SERPL ELPH: 7.5 G/DL (ref 6.4–8.2)
OSMOLALITY SERPL CALC.SUM OF ELEC: 286 MOSM/KG (ref 275–295)
PATIENT FASTING Y/N/NP: NO
POTASSIUM SERPL-SCNC: 3.9 MMOL/L (ref 3.5–5.1)
SODIUM SERPL-SCNC: 137 MMOL/L (ref 136–145)
T3FREE SERPL-MCNC: 2.17 PG/ML (ref 2.4–4.2)
TSI SER-ACNC: 2.64 MIU/ML (ref 0.36–3.74)
VIT B12 SERPL-MCNC: 492 PG/ML (ref 193–986)

## 2021-08-23 PROCEDURE — 87046 STOOL CULTR AEROBIC BACT EA: CPT

## 2021-08-23 PROCEDURE — 84443 ASSAY THYROID STIM HORMONE: CPT

## 2021-08-23 PROCEDURE — 83002 ASSAY OF GONADOTROPIN (LH): CPT

## 2021-08-23 PROCEDURE — 84403 ASSAY OF TOTAL TESTOSTERONE: CPT

## 2021-08-23 PROCEDURE — 83835 ASSAY OF METANEPHRINES: CPT

## 2021-08-23 PROCEDURE — 84681 ASSAY OF C-PEPTIDE: CPT

## 2021-08-23 PROCEDURE — 86800 THYROGLOBULIN ANTIBODY: CPT

## 2021-08-23 PROCEDURE — 87272 CRYPTOSPORIDIUM AG IF: CPT

## 2021-08-23 PROCEDURE — 80053 COMPREHEN METABOLIC PANEL: CPT

## 2021-08-23 PROCEDURE — 84481 FREE ASSAY (FT-3): CPT

## 2021-08-23 PROCEDURE — 87427 SHIGA-LIKE TOXIN AG IA: CPT

## 2021-08-23 PROCEDURE — 84402 ASSAY OF FREE TESTOSTERONE: CPT

## 2021-08-23 PROCEDURE — 87045 FECES CULTURE AEROBIC BACT: CPT

## 2021-08-23 PROCEDURE — 87493 C DIFF AMPLIFIED PROBE: CPT

## 2021-08-23 PROCEDURE — 87329 GIARDIA AG IA: CPT

## 2021-08-23 PROCEDURE — 36415 COLL VENOUS BLD VENIPUNCTURE: CPT

## 2021-08-23 PROCEDURE — 82627 DEHYDROEPIANDROSTERONE: CPT

## 2021-08-23 PROCEDURE — 82607 VITAMIN B-12: CPT

## 2021-08-23 PROCEDURE — 83001 ASSAY OF GONADOTROPIN (FSH): CPT

## 2021-08-24 LAB
C DIFF TOX B STL QL: NEGATIVE
THYROGLOB SERPL-MCNC: 233 U/ML (ref ?–60)

## 2021-08-25 LAB — C-PEPTIDE, SERUM OR PLASMA: 2.8 NG/ML

## 2021-08-26 LAB
METANEPHRINE: 0.13 NMOL/L
NORMETANEPHRINE: 0.47 NMOL/L

## 2021-08-27 LAB
TESTOSTERONE, FREE, S: 0.26 NG/DL
TESTOSTERONE, TOTAL, S: 8.9 NG/DL

## 2021-08-30 ENCOUNTER — TELEPHONE (OUTPATIENT)
Dept: FAMILY MEDICINE CLINIC | Facility: CLINIC | Age: 51
End: 2021-08-30

## 2021-08-30 NOTE — TELEPHONE ENCOUNTER
Patient Review of Clinical Information             Medications    The patient or proxy has not reviewed this information, and there are updates pending:     Requested Medication Removals Start Date Reported By  Comments    gabapentin 300 MG Caps 7/

## 2021-09-06 DIAGNOSIS — D50.9 IRON DEFICIENCY ANEMIA, UNSPECIFIED IRON DEFICIENCY ANEMIA TYPE: ICD-10-CM

## 2021-09-07 RX ORDER — FERROUS SULFATE 325(65) MG
1 TABLET ORAL
Qty: 90 TABLET | Refills: 0 | Status: SHIPPED | OUTPATIENT
Start: 2021-09-07 | End: 2021-12-12

## 2021-09-07 NOTE — TELEPHONE ENCOUNTER
Please review; protocol failed/ no protocol.  Patient has appt 9/9/21 for physical.    Requested Prescriptions   Pending Prescriptions Disp Refills    FEROSUL 325 (65 Fe) MG Oral Tab [Pharmacy Med Name: FERROUS SULFATE 325MG (5GR) TABS] 90 tablet 0     Sig:

## 2021-09-09 ENCOUNTER — OFFICE VISIT (OUTPATIENT)
Dept: FAMILY MEDICINE CLINIC | Facility: CLINIC | Age: 51
End: 2021-09-09
Payer: COMMERCIAL

## 2021-09-09 VITALS
WEIGHT: 194 LBS | SYSTOLIC BLOOD PRESSURE: 120 MMHG | HEIGHT: 67 IN | DIASTOLIC BLOOD PRESSURE: 77 MMHG | HEART RATE: 93 BPM | BODY MASS INDEX: 30.45 KG/M2 | TEMPERATURE: 97 F

## 2021-09-09 DIAGNOSIS — K76.9 LIVER LESION, RIGHT LOBE: ICD-10-CM

## 2021-09-09 DIAGNOSIS — F41.9 ANXIETY AND DEPRESSION: ICD-10-CM

## 2021-09-09 DIAGNOSIS — Z13.6 ENCOUNTER FOR SCREENING FOR CARDIOVASCULAR DISORDERS: ICD-10-CM

## 2021-09-09 DIAGNOSIS — E66.9 OBESITY (BMI 30.0-34.9): ICD-10-CM

## 2021-09-09 DIAGNOSIS — F10.21 HISTORY OF ALCOHOLISM (HCC): ICD-10-CM

## 2021-09-09 DIAGNOSIS — D35.02 ADENOMA OF LEFT ADRENAL GLAND: ICD-10-CM

## 2021-09-09 DIAGNOSIS — R10.9 ABDOMINAL CRAMPING: ICD-10-CM

## 2021-09-09 DIAGNOSIS — Z23 NEED FOR SHINGLES VACCINE: ICD-10-CM

## 2021-09-09 DIAGNOSIS — E03.9 HYPOTHYROIDISM, UNSPECIFIED TYPE: ICD-10-CM

## 2021-09-09 DIAGNOSIS — H91.93 BILATERAL HEARING LOSS, UNSPECIFIED HEARING LOSS TYPE: ICD-10-CM

## 2021-09-09 DIAGNOSIS — Z13.1 SCREENING FOR DIABETES MELLITUS: ICD-10-CM

## 2021-09-09 DIAGNOSIS — F32.A ANXIETY AND DEPRESSION: ICD-10-CM

## 2021-09-09 DIAGNOSIS — L65.9 HAIR THINNING: ICD-10-CM

## 2021-09-09 DIAGNOSIS — E78.00 HYPERCHOLESTEREMIA: ICD-10-CM

## 2021-09-09 DIAGNOSIS — Z00.00 WELL ADULT EXAM: Primary | ICD-10-CM

## 2021-09-09 DIAGNOSIS — Z01.419 ENCOUNTER FOR GYNECOLOGICAL EXAMINATION: ICD-10-CM

## 2021-09-09 DIAGNOSIS — D50.8 OTHER IRON DEFICIENCY ANEMIA: ICD-10-CM

## 2021-09-09 DIAGNOSIS — R19.8 IRREGULAR BOWEL HABITS: ICD-10-CM

## 2021-09-09 PROCEDURE — 3078F DIAST BP <80 MM HG: CPT | Performed by: FAMILY MEDICINE

## 2021-09-09 PROCEDURE — 99396 PREV VISIT EST AGE 40-64: CPT | Performed by: FAMILY MEDICINE

## 2021-09-09 PROCEDURE — 3008F BODY MASS INDEX DOCD: CPT | Performed by: FAMILY MEDICINE

## 2021-09-09 PROCEDURE — 3074F SYST BP LT 130 MM HG: CPT | Performed by: FAMILY MEDICINE

## 2021-09-09 RX ORDER — NAPROXEN 500 MG/1
500 TABLET ORAL
COMMUNITY

## 2021-09-09 RX ORDER — SPIRONOLACTONE 25 MG/1
25 TABLET ORAL 2 TIMES DAILY
COMMUNITY
Start: 2021-08-19

## 2021-09-09 RX ORDER — TIZANIDINE 4 MG/1
TABLET ORAL
COMMUNITY
Start: 2021-07-12

## 2021-09-09 NOTE — PROGRESS NOTES
HPI:    Patient ID: Rosaura Carlton is a 46year old female.     HPI  Patient presents with:  Routine Physical  Lab Results      Wt Readings from Last 6 Encounters:  09/09/21 : 194 lb (88 kg)  08/13/21 : 198 lb (89.8 kg)  05/11/21 : 191 lb (86.6 kg)  04 Psychiatric/Behavioral: Negative for behavioral problems, decreased concentration, self-injury, sleep disturbance and suicidal ideas. The patient is not nervous/anxious.         /77   Pulse 93   Temp 96.5 °F (35.8 °C) (Temporal)   Ht 5' 7\" (1.702 m Asked        Back Care: Not Asked        Exercise: No        Bike Helmet: Not Asked        Seat Belt: Not Asked        Self-Exams: Not Asked    Social History Narrative      Inetta Alpers is single. She has no children. Patient works as a .  She lives [place] COVID-19 Vaccine(1) Never done  Zoster Vaccines(1 of 2) Never done  Mammogram due on 11/04/2020  Annual Depression Screen due on 07/28/2021  Annual Physical due on 07/28/2021  Influenza Vaccine(1) due on 10/01/2021  Pap Smear,3 Years due on 07/26/202 HIVES  Codeine [Opioid Evelyn*    ITCHING  Vicodin [Hydrocodon*    ITCHING  Vosol Hc [Hydrocort*    ITCHING   PHYSICAL EXAM:   Patient presents with:  Routine Physical  Lab Results     Physical Exam  Constitutional:       Appearance: She is well-developed. She is alert and oriented to person, place, and time. Cranial Nerves: No cranial nerve deficit. Motor: No abnormal muscle tone. Coordination: Coordination normal.      Deep Tendon Reflexes: Reflexes are normal and symmetric.    Psychiatric: minutes 5-6 days a week. Avoid skipping meals. Making healthy choices for snacks and also limiting sugary beverages. 11. Abdominal cramping  Improved  Saw GI  Trying new probiotics    12. Irregular bowel habits  As above    13.  Need for shingles v

## 2021-09-21 ENCOUNTER — HOSPITAL ENCOUNTER (OUTPATIENT)
Dept: CT IMAGING | Facility: HOSPITAL | Age: 51
Discharge: HOME | End: 2021-09-21
Attending: INTERNAL MEDICINE
Payer: COMMERCIAL

## 2021-09-21 ENCOUNTER — HOSPITAL ENCOUNTER (OUTPATIENT)
Dept: ULTRASOUND IMAGING | Facility: HOSPITAL | Age: 51
Discharge: HOME | End: 2021-09-21
Attending: INTERNAL MEDICINE
Payer: COMMERCIAL

## 2021-09-21 DIAGNOSIS — E03.9 HYPOTHYROID: ICD-10-CM

## 2021-09-21 DIAGNOSIS — D35.00 ADRENAL ADENOMA, UNSPECIFIED LATERALITY: ICD-10-CM

## 2021-09-21 PROCEDURE — 74178 CT ABD&PLV WO CNTR FLWD CNTR: CPT | Performed by: INTERNAL MEDICINE

## 2021-09-21 PROCEDURE — 76536 US EXAM OF HEAD AND NECK: CPT | Performed by: INTERNAL MEDICINE

## 2021-12-12 DIAGNOSIS — D50.9 IRON DEFICIENCY ANEMIA, UNSPECIFIED IRON DEFICIENCY ANEMIA TYPE: ICD-10-CM

## 2021-12-12 RX ORDER — LIDOCAINE HYDROCHLORIDE 20 MG/ML
SOLUTION ORAL; TOPICAL
Qty: 90 TABLET | Refills: 0 | Status: SHIPPED | OUTPATIENT
Start: 2021-12-12

## 2022-01-15 ENCOUNTER — LAB ENCOUNTER (OUTPATIENT)
Dept: LAB | Age: 52
End: 2022-01-15
Attending: FAMILY MEDICINE
Payer: COMMERCIAL

## 2022-01-15 DIAGNOSIS — Z13.1 SCREENING FOR DIABETES MELLITUS: ICD-10-CM

## 2022-01-15 DIAGNOSIS — Z00.00 WELL ADULT EXAM: ICD-10-CM

## 2022-01-15 LAB
CHOLEST SERPL-MCNC: 200 MG/DL (ref ?–200)
EST. AVERAGE GLUCOSE BLD GHB EST-MCNC: 111 MG/DL (ref 68–126)
FASTING PATIENT LIPID ANSWER: YES
HBA1C MFR BLD: 5.5 % (ref ?–5.7)
HDLC SERPL-MCNC: 48 MG/DL (ref 40–59)
LDLC SERPL CALC-MCNC: 99 MG/DL (ref ?–100)
NONHDLC SERPL-MCNC: 152 MG/DL (ref ?–130)
TRIGL SERPL-MCNC: 316 MG/DL (ref 30–149)
VLDLC SERPL CALC-MCNC: 53 MG/DL (ref 0–30)

## 2022-01-15 PROCEDURE — 80061 LIPID PANEL: CPT

## 2022-01-15 PROCEDURE — 36415 COLL VENOUS BLD VENIPUNCTURE: CPT

## 2022-01-15 PROCEDURE — 83036 HEMOGLOBIN GLYCOSYLATED A1C: CPT

## 2022-01-19 DIAGNOSIS — E78.00 HYPERCHOLESTEREMIA: ICD-10-CM

## 2022-01-19 RX ORDER — ATORVASTATIN CALCIUM 20 MG/1
20 TABLET, FILM COATED ORAL DAILY
Qty: 90 TABLET | Refills: 2 | Status: SHIPPED | OUTPATIENT
Start: 2022-01-19

## 2022-01-22 ENCOUNTER — LAB ENCOUNTER (OUTPATIENT)
Dept: LAB | Age: 52
End: 2022-01-22
Payer: COMMERCIAL

## 2022-01-22 DIAGNOSIS — E03.9 HYPOTHYROIDISM, ADULT: Primary | ICD-10-CM

## 2022-01-22 DIAGNOSIS — R63.5 ABNORMAL WEIGHT GAIN: ICD-10-CM

## 2022-01-22 DIAGNOSIS — D35.00 ADRENAL ADENOMA: ICD-10-CM

## 2022-01-22 DIAGNOSIS — R53.83 FATIGUE: ICD-10-CM

## 2022-01-22 DIAGNOSIS — L65.9 LOSS OF HAIR: ICD-10-CM

## 2022-01-22 DIAGNOSIS — E55.9 VITAMIN D DEFICIENCY: ICD-10-CM

## 2022-01-22 LAB
BASOPHILS # BLD AUTO: 0.08 X10(3) UL (ref 0–0.2)
BASOPHILS NFR BLD AUTO: 1.1 %
CORTIS SERPL-MCNC: 17.8 UG/DL
DEPRECATED RDW RBC AUTO: 40.9 FL (ref 35.1–46.3)
EOSINOPHIL # BLD AUTO: 0.09 X10(3) UL (ref 0–0.7)
EOSINOPHIL NFR BLD AUTO: 1.2 %
ERYTHROCYTE [DISTWIDTH] IN BLOOD BY AUTOMATED COUNT: 12.3 % (ref 11–15)
HCT VFR BLD AUTO: 43.5 %
HGB BLD-MCNC: 14.4 G/DL
IMM GRANULOCYTES # BLD AUTO: 0.03 X10(3) UL (ref 0–1)
IMM GRANULOCYTES NFR BLD: 0.4 %
LYMPHOCYTES # BLD AUTO: 1.45 X10(3) UL (ref 1–4)
LYMPHOCYTES NFR BLD AUTO: 20.1 %
MCH RBC QN AUTO: 30.1 PG (ref 26–34)
MCHC RBC AUTO-ENTMCNC: 33.1 G/DL (ref 31–37)
MCV RBC AUTO: 90.8 FL
MONOCYTES # BLD AUTO: 0.49 X10(3) UL (ref 0.1–1)
MONOCYTES NFR BLD AUTO: 6.8 %
NEUTROPHILS # BLD AUTO: 5.07 X10 (3) UL (ref 1.5–7.7)
NEUTROPHILS # BLD AUTO: 5.07 X10(3) UL (ref 1.5–7.7)
NEUTROPHILS NFR BLD AUTO: 70.4 %
PLATELET # BLD AUTO: 289 10(3)UL (ref 150–450)
RBC # BLD AUTO: 4.79 X10(6)UL
T4 FREE SERPL-MCNC: 0.9 NG/DL (ref 0.8–1.7)
TSI SER-ACNC: 4.75 MIU/ML (ref 0.36–3.74)
VIT D+METAB SERPL-MCNC: 89.2 NG/ML (ref 30–100)
WBC # BLD AUTO: 7.2 X10(3) UL (ref 4–11)

## 2022-01-22 PROCEDURE — 83835 ASSAY OF METANEPHRINES: CPT

## 2022-01-22 PROCEDURE — 82306 VITAMIN D 25 HYDROXY: CPT

## 2022-01-22 PROCEDURE — 82533 TOTAL CORTISOL: CPT

## 2022-01-22 PROCEDURE — 84443 ASSAY THYROID STIM HORMONE: CPT

## 2022-01-22 PROCEDURE — 36415 COLL VENOUS BLD VENIPUNCTURE: CPT

## 2022-01-22 PROCEDURE — 85025 COMPLETE CBC W/AUTO DIFF WBC: CPT

## 2022-01-22 PROCEDURE — 84439 ASSAY OF FREE THYROXINE: CPT

## 2022-01-26 LAB
METANEPHRINE: 0.17 NMOL/L
NORMETANEPHRINE: 0.95 NMOL/L

## 2022-02-03 ENCOUNTER — TELEPHONE (OUTPATIENT)
Dept: FAMILY MEDICINE CLINIC | Facility: CLINIC | Age: 52
End: 2022-02-03

## 2022-02-03 NOTE — TELEPHONE ENCOUNTER
Pt was asking about her recent test results (which were ordered by another MD). She was asking what her Iron level. Since she has iron deficiency anemia, she is concerned about this level. I advised that the iron was not checked this time and it was normal when last checked in Augaust.   Pt is asking if this could be checked again soon as her low iron did contribute to her hair loss and she doesn't want to have trouble with this again.

## 2022-02-05 ENCOUNTER — LAB ENCOUNTER (OUTPATIENT)
Dept: LAB | Age: 52
End: 2022-02-05
Attending: FAMILY MEDICINE
Payer: COMMERCIAL

## 2022-02-05 DIAGNOSIS — D50.8 OTHER IRON DEFICIENCY ANEMIA: ICD-10-CM

## 2022-02-05 LAB
IRON SATN MFR SERPL: 20 %
IRON SERPL-MCNC: 96 UG/DL
TIBC SERPL-MCNC: 474 UG/DL (ref 240–450)
TRANSFERRIN SERPL-MCNC: 318 MG/DL (ref 200–360)

## 2022-02-05 PROCEDURE — 83540 ASSAY OF IRON: CPT

## 2022-02-05 PROCEDURE — 84466 ASSAY OF TRANSFERRIN: CPT

## 2022-02-05 PROCEDURE — 36415 COLL VENOUS BLD VENIPUNCTURE: CPT

## 2022-03-21 ENCOUNTER — ORDER TRANSCRIPTION (OUTPATIENT)
Dept: ADMINISTRATIVE | Facility: HOSPITAL | Age: 52
End: 2022-03-21

## 2022-04-05 RX ORDER — LIDOCAINE HYDROCHLORIDE 20 MG/ML
SOLUTION ORAL; TOPICAL
Qty: 90 TABLET | Refills: 0 | Status: SHIPPED | OUTPATIENT
Start: 2022-04-05

## 2022-05-10 ENCOUNTER — LAB ENCOUNTER (OUTPATIENT)
Dept: LAB | Age: 52
End: 2022-05-10
Attending: INTERNAL MEDICINE
Payer: COMMERCIAL

## 2022-05-10 DIAGNOSIS — E03.9 HYPOTHYROIDISM: Primary | ICD-10-CM

## 2022-05-10 DIAGNOSIS — E55.9 VITAMIN D DEFICIENCY: ICD-10-CM

## 2022-05-10 DIAGNOSIS — D35.00 ADRENAL ADENOMA: ICD-10-CM

## 2022-05-10 DIAGNOSIS — R63.5 ABNORMAL WEIGHT GAIN: ICD-10-CM

## 2022-05-10 LAB
ALBUMIN SERPL-MCNC: 3.7 G/DL (ref 3.4–5)
ALBUMIN/GLOB SERPL: 1 {RATIO} (ref 1–2)
ALP LIVER SERPL-CCNC: 66 U/L
ALT SERPL-CCNC: 33 U/L
ANION GAP SERPL CALC-SCNC: 6 MMOL/L (ref 0–18)
AST SERPL-CCNC: 26 U/L (ref 15–37)
BASOPHILS # BLD AUTO: 0.05 X10(3) UL (ref 0–0.2)
BASOPHILS NFR BLD AUTO: 0.7 %
BILIRUB SERPL-MCNC: 0.4 MG/DL (ref 0.1–2)
BUN BLD-MCNC: 18 MG/DL (ref 7–18)
BUN/CREAT SERPL: 19.4 (ref 10–20)
CALCIUM BLD-MCNC: 9.3 MG/DL (ref 8.5–10.1)
CHLORIDE SERPL-SCNC: 105 MMOL/L (ref 98–112)
CO2 SERPL-SCNC: 27 MMOL/L (ref 21–32)
CREAT BLD-MCNC: 0.93 MG/DL
DEPRECATED RDW RBC AUTO: 42 FL (ref 35.1–46.3)
EOSINOPHIL # BLD AUTO: 0.1 X10(3) UL (ref 0–0.7)
EOSINOPHIL NFR BLD AUTO: 1.4 %
ERYTHROCYTE [DISTWIDTH] IN BLOOD BY AUTOMATED COUNT: 12.8 % (ref 11–15)
FASTING STATUS PATIENT QL REPORTED: NO
GLOBULIN PLAS-MCNC: 3.8 G/DL (ref 2.8–4.4)
GLUCOSE BLD-MCNC: 113 MG/DL (ref 70–99)
HCT VFR BLD AUTO: 40.1 %
HGB BLD-MCNC: 13.4 G/DL
IMM GRANULOCYTES # BLD AUTO: 0.03 X10(3) UL (ref 0–1)
IMM GRANULOCYTES NFR BLD: 0.4 %
LYMPHOCYTES # BLD AUTO: 2.52 X10(3) UL (ref 1–4)
LYMPHOCYTES NFR BLD AUTO: 34.5 %
MCH RBC QN AUTO: 30.4 PG (ref 26–34)
MCHC RBC AUTO-ENTMCNC: 33.4 G/DL (ref 31–37)
MCV RBC AUTO: 90.9 FL
MONOCYTES # BLD AUTO: 0.5 X10(3) UL (ref 0.1–1)
MONOCYTES NFR BLD AUTO: 6.8 %
NEUTROPHILS # BLD AUTO: 4.11 X10 (3) UL (ref 1.5–7.7)
NEUTROPHILS # BLD AUTO: 4.11 X10(3) UL (ref 1.5–7.7)
NEUTROPHILS NFR BLD AUTO: 56.2 %
OSMOLALITY SERPL CALC.SUM OF ELEC: 289 MOSM/KG (ref 275–295)
PLATELET # BLD AUTO: 293 10(3)UL (ref 150–450)
POTASSIUM SERPL-SCNC: 4 MMOL/L (ref 3.5–5.1)
PROT SERPL-MCNC: 7.5 G/DL (ref 6.4–8.2)
RBC # BLD AUTO: 4.41 X10(6)UL
SODIUM SERPL-SCNC: 138 MMOL/L (ref 136–145)
T4 FREE SERPL-MCNC: 0.8 NG/DL (ref 0.8–1.7)
TSI SER-ACNC: 3.31 MIU/ML (ref 0.36–3.74)
WBC # BLD AUTO: 7.3 X10(3) UL (ref 4–11)

## 2022-05-10 PROCEDURE — 84443 ASSAY THYROID STIM HORMONE: CPT

## 2022-05-10 PROCEDURE — 84439 ASSAY OF FREE THYROXINE: CPT

## 2022-05-10 PROCEDURE — 85025 COMPLETE CBC W/AUTO DIFF WBC: CPT

## 2022-05-10 PROCEDURE — 36415 COLL VENOUS BLD VENIPUNCTURE: CPT

## 2022-05-10 PROCEDURE — 80053 COMPREHEN METABOLIC PANEL: CPT

## 2022-06-16 ENCOUNTER — HOSPITAL ENCOUNTER (OUTPATIENT)
Dept: CT IMAGING | Facility: HOSPITAL | Age: 52
Discharge: HOME | End: 2022-06-16
Attending: FAMILY MEDICINE

## 2022-06-16 VITALS — HEIGHT: 67 IN | BODY MASS INDEX: 30.45 KG/M2 | WEIGHT: 194 LBS

## 2022-06-16 DIAGNOSIS — Z13.6 SCREENING FOR CARDIOVASCULAR CONDITION: ICD-10-CM

## 2022-07-11 RX ORDER — VALACYCLOVIR HYDROCHLORIDE 1 G/1
TABLET, FILM COATED ORAL
Qty: 4 TABLET | Refills: 2 | Status: SHIPPED | OUTPATIENT
Start: 2022-07-11

## 2022-07-12 DIAGNOSIS — D50.9 IRON DEFICIENCY ANEMIA, UNSPECIFIED IRON DEFICIENCY ANEMIA TYPE: ICD-10-CM

## 2022-07-12 RX ORDER — LIDOCAINE HYDROCHLORIDE 20 MG/ML
SOLUTION ORAL; TOPICAL
Qty: 90 TABLET | Refills: 0 | Status: SHIPPED | OUTPATIENT
Start: 2022-07-12

## 2022-11-06 DIAGNOSIS — D50.9 IRON DEFICIENCY ANEMIA, UNSPECIFIED IRON DEFICIENCY ANEMIA TYPE: ICD-10-CM

## 2022-11-07 RX ORDER — LIDOCAINE HYDROCHLORIDE 20 MG/ML
SOLUTION ORAL; TOPICAL
Qty: 90 TABLET | Refills: 0 | Status: SHIPPED | OUTPATIENT
Start: 2022-11-07

## 2022-11-07 RX ORDER — VALACYCLOVIR HYDROCHLORIDE 1 G/1
TABLET, FILM COATED ORAL
Qty: 4 TABLET | Refills: 2 | Status: SHIPPED | OUTPATIENT
Start: 2022-11-07

## 2022-11-22 DIAGNOSIS — E78.00 HYPERCHOLESTEREMIA: ICD-10-CM

## 2022-11-22 RX ORDER — ATORVASTATIN CALCIUM 20 MG/1
TABLET, FILM COATED ORAL
Qty: 90 TABLET | Refills: 0 | OUTPATIENT
Start: 2022-11-22

## 2022-11-22 NOTE — TELEPHONE ENCOUNTER
See 11/10/22 refill request. Patient needs an appointment for further refills.      Future Appointments   Date Time Provider Zora Guerrier   12/7/2022  1:00 PM Marcia Sales MD [address] [place] [address]

## 2023-02-08 ENCOUNTER — NURSE TRIAGE (OUTPATIENT)
Dept: FAMILY MEDICINE CLINIC | Facility: CLINIC | Age: 53
End: 2023-02-08

## 2023-02-08 DIAGNOSIS — Z00.00 WELL ADULT EXAM: Primary | ICD-10-CM

## 2023-02-08 NOTE — TELEPHONE ENCOUNTER
Pt stated she have a phx appt -requesting Lab TSH prior to appt 2/27/23  LOV 9/9/21, was seeing Dr Lindsey Esparza -last labs was 5/2022-normal  Stated have lost 15 pounds, feels cold all the time , nauseated , taking Levothyroxine . [address] [facility]

## 2023-02-24 ENCOUNTER — LAB ENCOUNTER (OUTPATIENT)
Dept: LAB | Age: 53
End: 2023-02-24
Attending: FAMILY MEDICINE
Payer: COMMERCIAL

## 2023-02-24 DIAGNOSIS — D50.9 IRON DEFICIENCY ANEMIA, UNSPECIFIED IRON DEFICIENCY ANEMIA TYPE: ICD-10-CM

## 2023-02-24 DIAGNOSIS — Z00.00 WELL ADULT EXAM: ICD-10-CM

## 2023-02-24 LAB
ALBUMIN SERPL-MCNC: 4 G/DL (ref 3.4–5)
ALBUMIN/GLOB SERPL: 1.1 {RATIO} (ref 1–2)
ALP LIVER SERPL-CCNC: 65 U/L
ALT SERPL-CCNC: 29 U/L
ANION GAP SERPL CALC-SCNC: 5 MMOL/L (ref 0–18)
AST SERPL-CCNC: 19 U/L (ref 15–37)
BASOPHILS # BLD AUTO: 0.06 X10(3) UL (ref 0–0.2)
BASOPHILS NFR BLD AUTO: 1 %
BILIRUB SERPL-MCNC: 0.4 MG/DL (ref 0.1–2)
BUN BLD-MCNC: 23 MG/DL (ref 7–18)
BUN/CREAT SERPL: 23 (ref 10–20)
CALCIUM BLD-MCNC: 9.5 MG/DL (ref 8.5–10.1)
CHLORIDE SERPL-SCNC: 107 MMOL/L (ref 98–112)
CHOLEST SERPL-MCNC: 172 MG/DL (ref ?–200)
CO2 SERPL-SCNC: 27 MMOL/L (ref 21–32)
CREAT BLD-MCNC: 1 MG/DL
DEPRECATED RDW RBC AUTO: 39.9 FL (ref 35.1–46.3)
EOSINOPHIL # BLD AUTO: 0.08 X10(3) UL (ref 0–0.7)
EOSINOPHIL NFR BLD AUTO: 1.3 %
ERYTHROCYTE [DISTWIDTH] IN BLOOD BY AUTOMATED COUNT: 12.2 % (ref 11–15)
FASTING PATIENT LIPID ANSWER: YES
FASTING STATUS PATIENT QL REPORTED: YES
GFR SERPLBLD BASED ON 1.73 SQ M-ARVRAT: 67 ML/MIN/1.73M2 (ref 60–?)
GLOBULIN PLAS-MCNC: 3.6 G/DL (ref 2.8–4.4)
GLUCOSE BLD-MCNC: 91 MG/DL (ref 70–99)
HCT VFR BLD AUTO: 40.5 %
HDLC SERPL-MCNC: 42 MG/DL (ref 40–59)
HGB BLD-MCNC: 13.8 G/DL
IMM GRANULOCYTES # BLD AUTO: 0.02 X10(3) UL (ref 0–1)
IMM GRANULOCYTES NFR BLD: 0.3 %
LDLC SERPL CALC-MCNC: 103 MG/DL (ref ?–100)
LYMPHOCYTES # BLD AUTO: 1.64 X10(3) UL (ref 1–4)
LYMPHOCYTES NFR BLD AUTO: 27.3 %
MCH RBC QN AUTO: 30.3 PG (ref 26–34)
MCHC RBC AUTO-ENTMCNC: 34.1 G/DL (ref 31–37)
MCV RBC AUTO: 89 FL
MONOCYTES # BLD AUTO: 0.52 X10(3) UL (ref 0.1–1)
MONOCYTES NFR BLD AUTO: 8.7 %
NEUTROPHILS # BLD AUTO: 3.68 X10 (3) UL (ref 1.5–7.7)
NEUTROPHILS # BLD AUTO: 3.68 X10(3) UL (ref 1.5–7.7)
NEUTROPHILS NFR BLD AUTO: 61.4 %
NONHDLC SERPL-MCNC: 130 MG/DL (ref ?–130)
OSMOLALITY SERPL CALC.SUM OF ELEC: 291 MOSM/KG (ref 275–295)
PLATELET # BLD AUTO: 285 10(3)UL (ref 150–450)
POTASSIUM SERPL-SCNC: 4.7 MMOL/L (ref 3.5–5.1)
PROT SERPL-MCNC: 7.6 G/DL (ref 6.4–8.2)
RBC # BLD AUTO: 4.55 X10(6)UL
SODIUM SERPL-SCNC: 139 MMOL/L (ref 136–145)
TRIGL SERPL-MCNC: 155 MG/DL (ref 30–149)
TSI SER-ACNC: 2.55 MIU/ML (ref 0.36–3.74)
VLDLC SERPL CALC-MCNC: 26 MG/DL (ref 0–30)
WBC # BLD AUTO: 6 X10(3) UL (ref 4–11)

## 2023-02-24 PROCEDURE — 84466 ASSAY OF TRANSFERRIN: CPT

## 2023-02-24 PROCEDURE — 80053 COMPREHEN METABOLIC PANEL: CPT

## 2023-02-24 PROCEDURE — 85025 COMPLETE CBC W/AUTO DIFF WBC: CPT

## 2023-02-24 PROCEDURE — 36415 COLL VENOUS BLD VENIPUNCTURE: CPT

## 2023-02-24 PROCEDURE — 83540 ASSAY OF IRON: CPT

## 2023-02-24 PROCEDURE — 84443 ASSAY THYROID STIM HORMONE: CPT

## 2023-02-24 PROCEDURE — 80061 LIPID PANEL: CPT

## 2023-02-27 ENCOUNTER — OFFICE VISIT (OUTPATIENT)
Dept: FAMILY MEDICINE CLINIC | Facility: CLINIC | Age: 53
End: 2023-02-27

## 2023-02-27 VITALS
SYSTOLIC BLOOD PRESSURE: 111 MMHG | DIASTOLIC BLOOD PRESSURE: 75 MMHG | BODY MASS INDEX: 30.13 KG/M2 | WEIGHT: 192 LBS | HEART RATE: 76 BPM | TEMPERATURE: 98 F | HEIGHT: 67 IN

## 2023-02-27 DIAGNOSIS — K76.9 LIVER LESION, RIGHT LOBE: ICD-10-CM

## 2023-02-27 DIAGNOSIS — E78.00 HYPERCHOLESTEREMIA: ICD-10-CM

## 2023-02-27 DIAGNOSIS — F32.A ANXIETY AND DEPRESSION: ICD-10-CM

## 2023-02-27 DIAGNOSIS — Z86.69 HISTORY OF MIGRAINE HEADACHES: ICD-10-CM

## 2023-02-27 DIAGNOSIS — D35.02 ADENOMA OF LEFT ADRENAL GLAND: ICD-10-CM

## 2023-02-27 DIAGNOSIS — S13.9XXA NECK SPRAIN, INITIAL ENCOUNTER: ICD-10-CM

## 2023-02-27 DIAGNOSIS — Z12.31 ENCOUNTER FOR SCREENING MAMMOGRAM FOR BREAST CANCER: ICD-10-CM

## 2023-02-27 DIAGNOSIS — Z97.4 HEARING AID WORN: ICD-10-CM

## 2023-02-27 DIAGNOSIS — D50.8 OTHER IRON DEFICIENCY ANEMIA: Primary | ICD-10-CM

## 2023-02-27 DIAGNOSIS — E66.9 OBESITY (BMI 30.0-34.9): ICD-10-CM

## 2023-02-27 DIAGNOSIS — D50.9 IRON DEFICIENCY ANEMIA, UNSPECIFIED IRON DEFICIENCY ANEMIA TYPE: ICD-10-CM

## 2023-02-27 DIAGNOSIS — E03.9 HYPOTHYROIDISM, UNSPECIFIED TYPE: ICD-10-CM

## 2023-02-27 DIAGNOSIS — Z00.00 WELL ADULT EXAM: Primary | ICD-10-CM

## 2023-02-27 DIAGNOSIS — F41.9 ANXIETY AND DEPRESSION: ICD-10-CM

## 2023-02-27 DIAGNOSIS — R23.2 HOT FLASHES: ICD-10-CM
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
## 2023-02-27 LAB
IRON SATN MFR SERPL: 9 %
IRON SERPL-MCNC: 43 UG/DL
TIBC SERPL-MCNC: 456 UG/DL (ref 240–450)
TRANSFERRIN SERPL-MCNC: 306 MG/DL (ref 200–360)

## 2023-02-27 PROCEDURE — 3008F BODY MASS INDEX DOCD: CPT | Performed by: FAMILY MEDICINE

## 2023-02-27 PROCEDURE — 99213 OFFICE O/P EST LOW 20 MIN: CPT | Performed by: FAMILY MEDICINE

## 2023-02-27 PROCEDURE — 90471 IMMUNIZATION ADMIN: CPT | Performed by: FAMILY MEDICINE

## 2023-02-27 PROCEDURE — 3074F SYST BP LT 130 MM HG: CPT | Performed by: FAMILY MEDICINE

## 2023-02-27 PROCEDURE — 90715 TDAP VACCINE 7 YRS/> IM: CPT | Performed by: FAMILY MEDICINE

## 2023-02-27 PROCEDURE — 3078F DIAST BP <80 MM HG: CPT | Performed by: FAMILY MEDICINE

## 2023-02-27 PROCEDURE — 99396 PREV VISIT EST AGE 40-64: CPT | Performed by: FAMILY MEDICINE

## 2023-02-27 RX ORDER — ERGOCALCIFEROL 1.25 MG/1
50000 CAPSULE ORAL AS DIRECTED
COMMUNITY
Start: 2021-10-01 | End: 2023-02-27

## 2023-02-27 RX ORDER — LEVOTHYROXINE SODIUM 137 UG/1
125 TABLET ORAL
COMMUNITY
Start: 2022-11-12

## 2023-04-17 ENCOUNTER — OFFICE VISIT (OUTPATIENT)
Dept: FAMILY MEDICINE CLINIC | Facility: CLINIC | Age: 53
End: 2023-04-17

## 2023-04-17 VITALS
DIASTOLIC BLOOD PRESSURE: 84 MMHG | WEIGHT: 193 LBS | SYSTOLIC BLOOD PRESSURE: 126 MMHG | BODY MASS INDEX: 30.29 KG/M2 | HEART RATE: 89 BPM | HEIGHT: 67 IN

## 2023-04-17 DIAGNOSIS — D50.9 IRON DEFICIENCY ANEMIA, UNSPECIFIED IRON DEFICIENCY ANEMIA TYPE: ICD-10-CM

## 2023-04-17 DIAGNOSIS — Z12.4 PAP SMEAR FOR CERVICAL CANCER SCREENING: Primary | ICD-10-CM

## 2023-04-17 DIAGNOSIS — Z01.84 IMMUNITY STATUS TESTING: ICD-10-CM

## 2023-04-17 DIAGNOSIS — M79.671 PAIN OF RIGHT HEEL: ICD-10-CM

## 2023-04-17 RX ORDER — ERGOCALCIFEROL 1.25 MG/1
CAPSULE ORAL
COMMUNITY
End: 2023-04-17 | Stop reason: DRUGHIGH

## 2023-04-17 RX ORDER — AMITRIPTYLINE HYDROCHLORIDE 25 MG/1
TABLET, FILM COATED ORAL
COMMUNITY

## 2023-04-17 RX ORDER — FERROUS SULFATE 325(65) MG
1 TABLET ORAL
Qty: 90 TABLET | Refills: 3 | Status: SHIPPED | OUTPATIENT
Start: 2023-04-17

## 2023-04-22 ENCOUNTER — HOSPITAL ENCOUNTER (OUTPATIENT)
Dept: MAMMOGRAPHY | Age: 53
Discharge: HOME | End: 2023-04-22
Attending: FAMILY MEDICINE
Payer: COMMERCIAL

## 2023-04-22 ENCOUNTER — LAB ENCOUNTER (OUTPATIENT)
Dept: LAB | Age: 53
End: 2023-04-22
Attending: FAMILY MEDICINE
Payer: COMMERCIAL

## 2023-04-22 DIAGNOSIS — R23.2 HOT FLASHES: ICD-10-CM

## 2023-04-22 DIAGNOSIS — Z01.84 IMMUNITY STATUS TESTING: ICD-10-CM

## 2023-04-22 DIAGNOSIS — Z12.31 ENCOUNTER FOR SCREENING MAMMOGRAM FOR BREAST CANCER: ICD-10-CM

## 2023-04-22 LAB
ESTRADIOL SERPL-MCNC: 42.2 PG/ML
FSH SERPL-ACNC: 4.6 MIU/ML
LH SERPL-ACNC: 1.7 MIU/ML

## 2023-04-22 PROCEDURE — 77067 SCR MAMMO BI INCL CAD: CPT | Performed by: FAMILY MEDICINE

## 2023-04-22 PROCEDURE — 82670 ASSAY OF TOTAL ESTRADIOL: CPT

## 2023-04-22 PROCEDURE — 36415 COLL VENOUS BLD VENIPUNCTURE: CPT

## 2023-04-22 PROCEDURE — 83001 ASSAY OF GONADOTROPIN (FSH): CPT

## 2023-04-22 PROCEDURE — 77063 BREAST TOMOSYNTHESIS BI: CPT | Performed by: FAMILY MEDICINE

## 2023-04-22 PROCEDURE — 83002 ASSAY OF GONADOTROPIN (LH): CPT

## 2023-04-22 PROCEDURE — 86787 VARICELLA-ZOSTER ANTIBODY: CPT

## 2023-04-24 DIAGNOSIS — Z01.84 IMMUNITY STATUS TESTING: Primary | ICD-10-CM

## 2023-04-24 LAB — VZV IGG SER IA-ACNC: 138.2 (ref 165–?)

## 2023-05-04 LAB — HPV I/H RISK 1 DNA SPEC QL NAA+PROBE: NEGATIVE

## 2023-05-05 LAB
LAST PAP RESULT: NORMAL
PAP HISTORY (OTHER THAN LAST PAP): NORMAL

## 2023-05-11 ENCOUNTER — TELEPHONE (OUTPATIENT)
Dept: FAMILY MEDICINE CLINIC | Facility: CLINIC | Age: 53
End: 2023-05-11

## 2023-08-08 DIAGNOSIS — E78.00 HYPERCHOLESTEREMIA: ICD-10-CM

## 2023-08-08 RX ORDER — ATORVASTATIN CALCIUM 20 MG/1
20 TABLET, FILM COATED ORAL DAILY
Qty: 90 TABLET | Refills: 3 | OUTPATIENT
Start: 2023-08-08

## 2024-01-11 DIAGNOSIS — D50.9 IRON DEFICIENCY ANEMIA, UNSPECIFIED IRON DEFICIENCY ANEMIA TYPE: ICD-10-CM

## 2024-01-11 DIAGNOSIS — E78.00 HYPERCHOLESTEREMIA: ICD-10-CM

## 2024-01-11 RX ORDER — LEVOTHYROXINE SODIUM 137 UG/1
137 TABLET ORAL
Qty: 90 TABLET | Refills: 0 | Status: SHIPPED | OUTPATIENT
Start: 2024-01-11

## 2024-01-11 RX ORDER — ATORVASTATIN CALCIUM 20 MG/1
20 TABLET, FILM COATED ORAL DAILY
Qty: 90 TABLET | Refills: 3 | Status: SHIPPED | OUTPATIENT
Start: 2024-01-11

## 2024-01-11 RX ORDER — VALACYCLOVIR HYDROCHLORIDE 1 G/1
2000 TABLET, FILM COATED ORAL 2 TIMES DAILY
Qty: 4 TABLET | Refills: 2 | Status: SHIPPED | OUTPATIENT
Start: 2024-01-11

## 2024-01-11 RX ORDER — FERROUS SULFATE 325(65) MG
1 TABLET ORAL
Qty: 90 TABLET | Refills: 0 | Status: SHIPPED | OUTPATIENT
Start: 2024-01-11

## 2024-01-11 NOTE — TELEPHONE ENCOUNTER
Please review; protocol failed/No Protocol  Levothyroxine was previously prescribed by Dr. Kinza Rivera-endocrinology   Requested Prescriptions   Pending Prescriptions Disp Refills    levothyroxine 137 MCG Oral Tab 90 tablet 1     Sig: Take 137 mcg by mouth before breakfast.       Thyroid Medication Protocol Passed - 1/11/2024  1:32 PM        Passed - TSH in past 12 months        Passed - Last TSH value is normal     Lab Results   Component Value Date    TSH 2.550 02/24/2023                 Passed - In person appointment or virtual visit in the past 12 mos or appointment in next 3 mos     Recent Outpatient Visits              8 months ago Pap smear for cervical cancer screening    [facility], Syed Morales MD    Office Visit    10 months ago [facility]    [facility]    2 years ago Well adult exam    [facility], Syed Morales MD    Office Visit    2 years ago Other iron deficiency anemia    [facility] Oncology Zacarias Grubbs MD    Office Visit    2 years ago Microscopic colitis, unspecified microscopic colitis type    [facility]ematology Oncology Zacarias Grubbs MD    Office Visit                        valACYclovir 1 G Oral Tab 4 tablet 2     Sig: Take 2 tablets (2,000 mg total) by mouth 2 (two) times daily. FOR 1 DAY       There is no refill protocol information for this order       Ferrous Sulfate (FEROSUL) 325 (65 Fe) MG Oral Tab 90 tablet 3     Sig: Take 1 tablet (325 mg total) by mouth daily with breakfast.       There is no refill protocol information for this order      Signed Prescriptions Disp Refills    atorvastatin 20 MG Oral Tab 90 tablet 3     Sig: Take 1 tablet (20 mg total) by mouth daily.       Cholesterol Medication Protocol Passed - 1/11/2024  1:32 PM        Passed - ALT in past 12 months         Passed - LDL in past 12 months        Passed - Last ALT < 80     Lab Results   Component Value Date    ALT 29 02/24/2023             Passed - Last LDL < 130     Lab Results   Component Value Date     (H) 02/24/2023             Passed - In person appointment or virtual visit in the past 12 mos or appointment in next 3 mos     Recent Outpatient Visits              [facility]    10 months ago [facility]    [facility]    2 years ago [facility]    [facility]    2 years ago Other iron deficiency anemia    [facility] Hematology Oncology Zacarias Grubbs MD    Office Visit    2 years ago Microscopic colitis, unspecified microscopic colitis type    [facility]ematology Oncology Zacarias Grubbs MD    Office Visit                           Recent Outpatient Visits              8 months ago [facility] for cervical cancer [facility]    [facility]    10 months ago Well adult exam    [facility]    [facility]    2 years ago [facility] exam    [facility]    [facility]    2 years ago Other iron deficiency anemia    [facility]ematology Oncology Zacarias Grubbs MD    Office Visit    2 years ago Microscopic colitis, unspecified microscopic colitis type    [facility]ematology Oncology Zacarias Grubbs MD    Office Visit

## 2024-01-11 NOTE — TELEPHONE ENCOUNTER
Refill passed per [facility] protocol.  Requested Prescriptions   Pending Prescriptions Disp Refills    levothyroxine 137 MCG Oral Tab 90 tablet 1     Sig: Take 125 mcg by mouth before breakfast.       Thyroid Medication Protocol Passed - 1/11/2024  1:32 PM        Passed - TSH in past 12 months        Passed - Last TSH value is normal     Lab Results   Component Value Date    TSH 2.550 02/24/2023                 Passed - In person appointment or virtual visit in the past 12 mos or appointment in next 3 mos     Recent Outpatient Visits              8 months ago Pap smear for cervical cancer screening    [facility], Syed Morales MD    Office Visit    10 months ago Well adult exam    [facility]    [facility]    2 years ago Well adult exam    [facility], Syed Morales MD    Office Visit    2 years ago Other iron deficiency anemia    [facility]ematology Oncology Zacarias Grubbs MD    Office Visit    2 years ago Microscopic colitis, unspecified microscopic colitis type    [facility] Oncology Zacarias Grubbs MD    Office Visit                        valACYclovir 1 G Oral Tab 4 tablet 2     Sig: Take 2 tablets (2,000 mg total) by mouth 2 (two) times daily. FOR 1 DAY       There is no refill protocol information for this order       atorvastatin 20 MG Oral Tab 90 tablet 3     Sig: Take 1 tablet (20 mg total) by mouth daily.       Cholesterol Medication Protocol Passed - 1/11/2024  1:32 PM        Passed - ALT in past 12 months        Passed - LDL in past 12 months        Passed - Last ALT < 80     Lab Results   Component Value Date    ALT 29 02/24/2023             Passed - Last LDL < 130     Lab Results   Component Value Date     (H) 02/24/2023             Passed - In person appointment or virtual visit in the past 12 mos or  appointment in next 3 mos     Recent Outpatient Visits              8 months [facility]    10 months ago [facility]    [facility]    2 years [facility]    [facility]    2 years ago Other iron deficiency anemia    [facility]ematology Oncology Zacarias Grubbs MD    Office Visit    2 years ago Microscopic colitis, unspecified microscopic colitis type    [facility]emat[facility] Oncology Zacarias Grubbs MD    Office Visit                        Ferrous Sulfate (FEROSUL) 325 (65 Fe) MG Oral Tab 90 tablet 3     Sig: Take 1 tablet (325 mg total) by mouth daily with breakfast.       There is no refill protocol information for this order          Recent Outpatient Visits              8 months ago Pap smear for cervical cancer screening    [facility], Syed Morales MD    Office Visit    10 months ago Well adult exam    [facility]    [facility]    2 years ago [facility]    [facility]    [facility]    2 years ago Other iron deficiency anemia    [facility]ematology Oncology Zacarias Grubbs MD    Office Visit    2 years ago Microscopic colitis, unspecified microscopic colitis type    [facility]emat[facility] Oncology Zacarias Grubbs MD    Office Visit

## 2024-01-11 NOTE — TELEPHONE ENCOUNTER
Pt would like a refill on her ferrous sulfate, levothyroxine, atorvastatin and valacyclovir medication. Per the patient she is out of medication. This will be going to a new pharmacy: Pharmacy: [facility] (listed)     Current Outpatient Medications   Medication Sig Dispense Refill    atorvastatin 20 MG Oral Tab Take 1 tablet (20 mg total) by mouth daily. 90 tablet 3    Ferrous Sulfate (FEROSUL) 325 (65 Fe) MG Oral Tab Take 1 tablet (325 mg total) by mouth daily with breakfast. 90 tablet 3    levothyroxine 137 MCG Oral Tab Take 125 mcg by mouth.      VALACYCLOVIR 1 G Oral Tab TAKE 2 TABLETS(2000 MG) BY MOUTH TWICE DAILY FOR 1 DAY 4 tablet 2

## 2024-02-29 NOTE — PROGRESS NOTES
Diagnosis:  Left shoulder pain, unspecified chronicity (M25.512)  Acute pain of right shoulder (M25.511)           Next MD visit: none scheduled  Fall Risk: standard         Precautions: n/a          Medication Changes since last visit?: No    Subjective: 2 x 20 each  - sidelying R scaption 3 x 10 AROM with 2# DB  - sidelying R shoulder ER 2# DB 3 x 10  - standing B shoulder ext with nyla 20# 3 x 10  - standing B scap rows with rope 20# 3 x 10  - standing B shoulder ER with Vabaduse 41 2 x 10   - standing B bicep cu standing B horizontal abduction with Vabaduse 41 2 x 10  - standing R shoulder ER with YTB 3 x 10   - standing B shoulder scaption 2# DB 2 x 10   - supine R/L c-rotation 15x  - supine c-retraction with self OP with towel under occiput 2x10  - supine B shoulder fle less with ADLs  3- Pt will demo increase in BUE strength to 5/5 to ease ability for pt to reach and lift    Plan: continue PT    Charges: Ex 3    Total Timed Treatment: 45 min  Total Treatment Time: 45 min 7.5 8 8 5

## 2024-03-14 ENCOUNTER — HOSPITAL ENCOUNTER (OUTPATIENT)
Dept: MRI IMAGING | Age: 54
Discharge: HOME | End: 2024-03-14
Attending: PODIATRIST
Payer: COMMERCIAL

## 2024-03-14 DIAGNOSIS — M76.60 ACHILLES TENDINITIS: ICD-10-CM

## 2024-03-14 PROCEDURE — 73721 MRI JNT OF LWR EXTRE W/O DYE: CPT | Performed by: PODIATRIST

## 2024-03-25 ENCOUNTER — TELEPHONE (OUTPATIENT)
Dept: FAMILY MEDICINE CLINIC | Facility: CLINIC | Age: 54
End: 2024-03-25

## 2024-03-25 DIAGNOSIS — D50.8 OTHER IRON DEFICIENCY ANEMIA: ICD-10-CM

## 2024-03-25 DIAGNOSIS — Z00.00 WELL ADULT EXAM: Primary | ICD-10-CM

## 2024-03-25 NOTE — TELEPHONE ENCOUNTER
Patient calling to request refill, verified pharmacy, is scheduled for visit on 04/16/24.     Current Outpatient Medications   Medication Sig Dispense Refill                         levothyroxine 137 MCG Oral Tab Take 137 mcg by mouth before breakfast. DUE FOR PHYSICAL END OF FEB AND LAB TESTS 90 tablet 0

## 2024-03-27 RX ORDER — LEVOTHYROXINE SODIUM 137 UG/1
137 TABLET ORAL
Qty: 90 TABLET | Refills: 0 | Status: SHIPPED | OUTPATIENT
Start: 2024-03-27

## 2024-03-27 NOTE — TELEPHONE ENCOUNTER
Protocol Failed/ No Protocol    Requested Prescriptions   Pending Prescriptions Disp Refills    levothyroxine 137 MCG Oral Tab 90 tablet 0     Sig: Take 137 mcg by mouth before breakfast. DUE FOR PHYSICAL END OF FEB AND LAB TESTS       Thyroid Medication Protocol Failed - 3/25/2024  5:13 PM        Failed - TSH in past 12 months        Passed - Last TSH value is normal     Lab Results   Component Value Date    TSH 2.550 02/24/2023                 Passed - In person appointment or virtual visit in the past 12 mos or appointment in next 3 mos     Recent Outpatient Visits              11 months ago [facility]    [facility] Visit    1 year ago Well adult exam    [facility] Syed Morales MD    Office Visit    2 years ago Well adult exam    [facility] Syed Morales MD    Office Visit    2 years ago Other iron deficiency anemia    [facility] Hematology Oncology Zacarias Grubbs MD    Office Visit    2 years ago Microscopic colitis, unspecified microscopic colitis type    [facility] Hematology Oncology Zacarias Grubbs MD    Office Visit          Future Appointments         Provider Department Appt Notes    In 3 weeks Syed Neal MD [place] Last px 02/27/2023                    Future Appointments         Provider Department Appt Notes    In 3 weeks Syed Neal MD [place] Last px 02/27/2023          Recent Outpatient Visits              11 months ago [facility]    [facility] Visit    1 year ago Well adult exam    [facility] Syed Morales MD    Office Visit    2 years ago Well  adult exam    [facility] [place] Syed Neal MD    Office Visit    2 years ago Other iron deficiency anemia    [facility] Hematology Oncology Zacarias Grubbs MD    Office Visit    2 years ago Microscopic colitis, unspecified microscopic colitis type    [facility] Hematology Oncology Zacarias Grubbs MD    Office Visit

## 2024-04-15 ENCOUNTER — LAB ENCOUNTER (OUTPATIENT)
Dept: LAB | Age: 54
End: 2024-04-15
Attending: FAMILY MEDICINE
Payer: COMMERCIAL

## 2024-04-15 DIAGNOSIS — D50.8 OTHER IRON DEFICIENCY ANEMIA: ICD-10-CM

## 2024-04-15 DIAGNOSIS — Z00.00 WELL ADULT EXAM: ICD-10-CM

## 2024-04-15 LAB
ALBUMIN SERPL-MCNC: 4.3 G/DL (ref 3.2–4.8)
ALBUMIN/GLOB SERPL: 1.5 {RATIO} (ref 1–2)
ALP LIVER SERPL-CCNC: 88 U/L
ALT SERPL-CCNC: 16 U/L
ANION GAP SERPL CALC-SCNC: 5 MMOL/L (ref 0–18)
AST SERPL-CCNC: 26 U/L (ref ?–34)
BASOPHILS # BLD AUTO: 0.04 X10(3) UL (ref 0–0.2)
BASOPHILS NFR BLD AUTO: 0.7 %
BILIRUB SERPL-MCNC: 0.3 MG/DL (ref 0.3–1.2)
BUN BLD-MCNC: 13 MG/DL (ref 9–23)
BUN/CREAT SERPL: 16.5 (ref 10–20)
CALCIUM BLD-MCNC: 9.6 MG/DL (ref 8.7–10.4)
CHLORIDE SERPL-SCNC: 110 MMOL/L (ref 98–112)
CHOLEST SERPL-MCNC: 184 MG/DL (ref ?–200)
CO2 SERPL-SCNC: 25 MMOL/L (ref 21–32)
CREAT BLD-MCNC: 0.79 MG/DL
DEPRECATED RDW RBC AUTO: 39.8 FL (ref 35.1–46.3)
EGFRCR SERPLBLD CKD-EPI 2021: 89 ML/MIN/1.73M2 (ref 60–?)
EOSINOPHIL # BLD AUTO: 0.08 X10(3) UL (ref 0–0.7)
EOSINOPHIL NFR BLD AUTO: 1.4 %
ERYTHROCYTE [DISTWIDTH] IN BLOOD BY AUTOMATED COUNT: 13 % (ref 11–15)
FASTING PATIENT LIPID ANSWER: YES
FASTING STATUS PATIENT QL REPORTED: YES
GLOBULIN PLAS-MCNC: 2.8 G/DL (ref 2.8–4.4)
GLUCOSE BLD-MCNC: 97 MG/DL (ref 70–99)
HCT VFR BLD AUTO: 41.1 %
HDLC SERPL-MCNC: 44 MG/DL (ref 40–59)
HGB BLD-MCNC: 14.3 G/DL
IMM GRANULOCYTES # BLD AUTO: 0.02 X10(3) UL (ref 0–1)
IMM GRANULOCYTES NFR BLD: 0.3 %
IRON SATN MFR SERPL: 11 %
IRON SERPL-MCNC: 38 UG/DL
LDLC SERPL CALC-MCNC: 110 MG/DL (ref ?–100)
LYMPHOCYTES # BLD AUTO: 1.61 X10(3) UL (ref 1–4)
LYMPHOCYTES NFR BLD AUTO: 27.4 %
MCH RBC QN AUTO: 29.9 PG (ref 26–34)
MCHC RBC AUTO-ENTMCNC: 34.8 G/DL (ref 31–37)
MCV RBC AUTO: 86 FL
MONOCYTES # BLD AUTO: 0.38 X10(3) UL (ref 0.1–1)
MONOCYTES NFR BLD AUTO: 6.5 %
NEUTROPHILS # BLD AUTO: 3.75 X10 (3) UL (ref 1.5–7.7)
NEUTROPHILS # BLD AUTO: 3.75 X10(3) UL (ref 1.5–7.7)
NEUTROPHILS NFR BLD AUTO: 63.7 %
NONHDLC SERPL-MCNC: 140 MG/DL (ref ?–130)
OSMOLALITY SERPL CALC.SUM OF ELEC: 290 MOSM/KG (ref 275–295)
PLATELET # BLD AUTO: 330 10(3)UL (ref 150–450)
POTASSIUM SERPL-SCNC: 4.2 MMOL/L (ref 3.5–5.1)
PROT SERPL-MCNC: 7.1 G/DL (ref 5.7–8.2)
RBC # BLD AUTO: 4.78 X10(6)UL
SODIUM SERPL-SCNC: 140 MMOL/L (ref 136–145)
T3FREE SERPL-MCNC: 3.08 PG/ML (ref 2.4–4.2)
T4 FREE SERPL-MCNC: 1.7 NG/DL (ref 0.8–1.7)
TIBC SERPL-MCNC: 353 UG/DL (ref 250–425)
TRANSFERRIN SERPL-MCNC: 237 MG/DL (ref 250–380)
TRIGL SERPL-MCNC: 168 MG/DL (ref 30–149)
TSI SER-ACNC: 0.05 MIU/ML (ref 0.55–4.78)
VLDLC SERPL CALC-MCNC: 29 MG/DL (ref 0–30)
WBC # BLD AUTO: 5.9 X10(3) UL (ref 4–11)

## 2024-04-15 PROCEDURE — 80053 COMPREHEN METABOLIC PANEL: CPT

## 2024-04-15 PROCEDURE — 84481 FREE ASSAY (FT-3): CPT

## 2024-04-15 PROCEDURE — 85025 COMPLETE CBC W/AUTO DIFF WBC: CPT

## 2024-04-15 PROCEDURE — 84439 ASSAY OF FREE THYROXINE: CPT

## 2024-04-15 PROCEDURE — 80061 LIPID PANEL: CPT

## 2024-04-15 PROCEDURE — 83540 ASSAY OF IRON: CPT

## 2024-04-15 PROCEDURE — 84466 ASSAY OF TRANSFERRIN: CPT

## 2024-04-15 PROCEDURE — 36415 COLL VENOUS BLD VENIPUNCTURE: CPT

## 2024-04-15 PROCEDURE — 84443 ASSAY THYROID STIM HORMONE: CPT

## 2024-04-16 ENCOUNTER — OFFICE VISIT (OUTPATIENT)
Dept: FAMILY MEDICINE CLINIC | Facility: CLINIC | Age: 54
End: 2024-04-16

## 2024-04-16 VITALS
BODY MASS INDEX: 29.66 KG/M2 | DIASTOLIC BLOOD PRESSURE: 90 MMHG | OXYGEN SATURATION: 98 % | WEIGHT: 189 LBS | SYSTOLIC BLOOD PRESSURE: 134 MMHG | HEART RATE: 75 BPM | HEIGHT: 67 IN

## 2024-04-16 DIAGNOSIS — D50.8 OTHER IRON DEFICIENCY ANEMIA: ICD-10-CM

## 2024-04-16 DIAGNOSIS — Z86.69 HISTORY OF MIGRAINE HEADACHES: ICD-10-CM

## 2024-04-16 DIAGNOSIS — F32.A ANXIETY AND DEPRESSION: ICD-10-CM

## 2024-04-16 DIAGNOSIS — D50.9 IRON DEFICIENCY ANEMIA, UNSPECIFIED IRON DEFICIENCY ANEMIA TYPE: ICD-10-CM

## 2024-04-16 DIAGNOSIS — D35.02 ADENOMA OF LEFT ADRENAL GLAND: ICD-10-CM

## 2024-04-16 DIAGNOSIS — Z12.31 ENCOUNTER FOR SCREENING MAMMOGRAM FOR BREAST CANCER: ICD-10-CM

## 2024-04-16 DIAGNOSIS — E78.00 HYPERCHOLESTEREMIA: ICD-10-CM

## 2024-04-16 DIAGNOSIS — N92.0 MENORRHAGIA WITH REGULAR CYCLE: ICD-10-CM

## 2024-04-16 DIAGNOSIS — E55.9 VITAMIN D DEFICIENCY: ICD-10-CM

## 2024-04-16 DIAGNOSIS — E03.9 HYPOTHYROIDISM, UNSPECIFIED TYPE: ICD-10-CM

## 2024-04-16 DIAGNOSIS — F41.9 ANXIETY AND DEPRESSION: ICD-10-CM

## 2024-04-16 DIAGNOSIS — Z00.00 WELL ADULT EXAM: Primary | ICD-10-CM

## 2024-04-16 PROCEDURE — 96127 BRIEF EMOTIONAL/BEHAV ASSMT: CPT | Performed by: FAMILY MEDICINE

## 2024-04-16 PROCEDURE — 3075F SYST BP GE 130 - 139MM HG: CPT | Performed by: FAMILY MEDICINE

## 2024-04-16 PROCEDURE — 3008F BODY MASS INDEX DOCD: CPT | Performed by: FAMILY MEDICINE

## 2024-04-16 PROCEDURE — 3080F DIAST BP >= 90 MM HG: CPT | Performed by: FAMILY MEDICINE

## 2024-04-16 PROCEDURE — 99396 PREV VISIT EST AGE 40-64: CPT | Performed by: FAMILY MEDICINE

## 2024-04-16 RX ORDER — FERROUS SULFATE 325(65) MG
1 TABLET ORAL 2 TIMES DAILY
Qty: 180 TABLET | Refills: 1 | Status: SHIPPED | OUTPATIENT
Start: 2024-04-16

## 2024-04-16 RX ORDER — VALACYCLOVIR HYDROCHLORIDE 1 G/1
2000 TABLET, FILM COATED ORAL 2 TIMES DAILY
Qty: 4 TABLET | Refills: 2 | Status: SHIPPED | OUTPATIENT
Start: 2024-04-16

## 2024-04-16 NOTE — PROGRESS NOTES
HPI:    Patient ID: Dorcas Salas is a 54 year old female.    HPI  Chief Complaint   Patient presents with    Physical       Wt Readings from Last 6 Encounters:   04/16/24 189 lb (85.7 kg)   04/17/23 193 lb (87.5 kg)   02/27/23 192 lb (87.1 kg)   06/16/22 194 lb (88 kg)   09/09/21 194 lb (88 kg)   08/13/21 198 lb (89.8 kg)     BP Readings from Last 3 Encounters:   04/16/24 134/90   04/17/23 126/84   02/27/23 111/75     Feels well  Still gets her menses every month and heavy with big blood clots  Has not seen gyne  Taking iron daily  Thinks taking 2 pills for thyroid daily.      Review of Systems   Constitutional:  Negative for activity change, appetite change, chills, fatigue, fever and unexpected weight change.   HENT:  Negative for congestion, dental problem, drooling, ear discharge, ear pain, facial swelling, hearing loss, mouth sores, nosebleeds, postnasal drip, rhinorrhea, sinus pressure, sinus pain, sneezing, sore throat, tinnitus, trouble swallowing and voice change.    Eyes:  Negative for pain, discharge, redness and visual disturbance.   Respiratory:  Negative for cough, shortness of breath and wheezing.    Cardiovascular:  Negative for chest pain, palpitations and leg swelling.   Gastrointestinal:  Negative for abdominal pain, anal bleeding, blood in stool, constipation, diarrhea, nausea, rectal pain and vomiting.   Endocrine: Negative for cold intolerance, heat intolerance, polydipsia, polyphagia and polyuria.   Genitourinary:  Positive for menstrual problem. Negative for decreased urine volume, difficulty urinating, dysuria, flank pain, frequency, pelvic pain, urgency, vaginal bleeding, vaginal discharge and vaginal pain.        Periods are regular but heavy      Musculoskeletal:  Negative for arthralgias, back pain and myalgias.   Skin:  Negative for rash.   Neurological:  Negative for dizziness, seizures, syncope, weakness, numbness and headaches.   Hematological:  Does not bruise/bleed easily.    Psychiatric/Behavioral:  Negative for behavioral problems, decreased concentration, self-injury, sleep disturbance and suicidal ideas. The patient is not nervous/anxious.        /90   Pulse 75   Ht 5' 7\" (1.702 m)   Wt 189 lb (85.7 kg)   LMP 04/10/2023 (Approximate)   SpO2 98%   BMI 29.60 kg/m²     Past Medical History:    Adrenal adenoma, left    ANEMIA    iron deficiency since being a teenager    Depression    sees Dr. Glez    Hearing loss    pt has bilat hearing aids    Herpes labialis    HNP (herniated nucleus pulposus), cervical    C7    Hyperlipidemia    Hypothyroidism    Migraines    Neurologist prescribing medication     Past Surgical History:   Procedure Laterality Date    Back surgery  2010    cervical lami C6-7, fusion with titanium cage, no bleeding complications    D & c      Other surgical history      bunionectomy bilat     Social History     Socioeconomic History    Marital status: Single     Spouse name: Not on file    Number of children: 0    Years of education: Not on file    Highest education level: Not on file   Occupational History    Occupation:    Tobacco Use    Smoking status: Former     Current packs/day: 0.00     Average packs/day: 0.5 packs/day for 20.0 years (10.0 ttl pk-yrs)     Types: Cigarettes     Start date: 1984     Quit date: 2004     Years since quittin.8    Smokeless tobacco: Never   Vaping Use    Vaping status: Never Used   Substance and Sexual Activity    Alcohol use: Yes     Comment: social     Drug use: No    Sexual activity: Not on file   Other Topics Concern     Service Not Asked    Blood Transfusions Not Asked    Caffeine Concern Yes    Occupational Exposure Not Asked    Hobby Hazards Not Asked    Sleep Concern Not Asked    Stress Concern Not Asked    Weight Concern Not Asked    Special Diet Not Asked    Back Care Not Asked    Exercise No    Bike Helmet Not Asked    Seat Belt Not Asked    Self-Exams Not Asked    Social History Narrative    Irma is single. She has no children. Patient works as a . She lives in [facility] Determinants of Health     Financial Resource Strain: Not on file   Food Insecurity: Not on file   Transportation Needs: Not on file   Physical Activity: Not on file   Stress: Not on file   Social Connections: Not on file   Housing Stability: Not on file     Family History   Problem Relation Age of Onset    Hypertension Mother     Other (primary biliary cirrhosis) Mother     Hypertension Father     Lipids Father     Heart Disorder Father         aortic valve replacement, CHF    Other (Other) Father     Other (dementia) Father     Other (CVA's starting late 30's) Maternal Grandmother     Cancer Paternal Grandmother 75        lung ca; tobacco user    Heart Disorder Paternal Grandfather         MI    Heart Disorder Paternal Uncle 60        CHF, CAD, PVD    Heart Disorder Paternal Uncle 65         pacemaker, PVD    Cancer Paternal Uncle 85        unknown type    Bleeding Disorders Neg     Clotting Disorder Neg     Breast Cancer Neg     Ovarian Cancer Neg        Immunization History   Administered Date(s) Administered    Covid-19 Vaccine Pfizer 30 mcg/0.3 ml 10/18/2021, 11/08/2021    Covid-19 Vaccine Pfizer Kel-Sucrose 30 mcg/0.3 ml 04/24/2022    Influenza 11/19/2018    TDAP 02/18/2013, 02/27/2023       Health Maintenance   Topic Date Due    Zoster Vaccines (1 of 2) Never done    COVID-19 Vaccine (4 - 2023-24 season) 09/01/2023    Annual Depression Screening  01/01/2024    Annual Physical  04/17/2024    Mammogram  04/22/2024    Influenza Vaccine (Season Ended) 10/01/2024    Pap Smear  04/17/2026    Colorectal Cancer Screening  12/24/2029    DTaP,Tdap,and Td Vaccines (3 - Td or Tdap) 02/27/2033    Pneumococcal Vaccine: Birth to 64yrs  Aged Out          Current Outpatient Medications   Medication Sig Dispense Refill    valACYclovir 1 G Oral Tab Take 2 tablets (2,000 mg total) by mouth 2  (two) times daily. FOR 1 DAY 4 tablet 2    Ferrous Sulfate (FEROSUL) 325 (65 Fe) MG Oral Tab Take 1 tablet (325 mg total) by mouth 2 (two) times daily. 180 tablet 1    levothyroxine 137 MCG Oral Tab Take 137 mcg by mouth before breakfast. DUE FOR PHYSICAL END OF FEB AND LAB TESTS 90 tablet 0    escitalopram (LEXAPRO) 20 MG Oral Tab Take 1 tablet (20 mg total) by mouth daily. 90 tablet 0    lamoTRIgine (LAMICTAL) 200 MG Oral Tab Take 1 tablet (200 mg total) by mouth 2 (two) times daily. 180 tablet 0    lurasidone (LATUDA) 40 MG Oral Tab Take 1 tablet (40 mg total) by mouth daily with dinner. 30 tablet 2    atorvastatin 20 MG Oral Tab Take 1 tablet (20 mg total) by mouth daily. 90 tablet 3    gabapentin 300 MG Oral Cap Take 2 capsules (600 mg total) by mouth 2 (two) times a day. 300 MG tablet twice a day 120 capsule 5    Rizatriptan Benzoate (MAXALT) 10 MG Oral Tab Take 1 tablet (10 mg total) by mouth as needed for Migraine. 9 tablet 11    Lysine HCl 1000 MG Oral Tab Take 1 tablet by mouth.      aspirin 81 MG Oral Tab Take 1 tablet (81 mg total) by mouth daily.      Cholecalciferol (VITAMIN D) 1000 UNITS Oral Cap Take 2,000 Units by mouth daily.        amitriptyline 25 MG Oral Tab amitriptyline 25 mg tablet   TAKE 1 TABLET BY MOUTH EVERY DAY AT BEDTIME (Patient not taking: Reported on 4/16/2024)      Multiple Vitamins-Minerals (BIOTIN PLUS/CALCIUM/VIT D3) Oral Tab Take by mouth. (Patient not taking: Reported on 4/16/2024)      AIMOVIG 70 MG/ML Subcutaneous  (Patient not taking: Reported on 4/16/2024)       Allergies:  Allergies   Allergen Reactions    Cortisporin [Neomycin-Polymyxin-Hc] HIVES    Codeine [Opioid Analgesics] ITCHING    Vicodin [Hydrocodone-Acetaminophen] ITCHING    Vosol Hc [Hydrocortisone-Acetic Acid] ITCHING      PHYSICAL EXAM:     Chief Complaint   Patient presents with    Physical      Physical Exam  Vitals and nursing note reviewed.   Constitutional:       Appearance: She is well-developed.   HENT:       Head: Normocephalic and atraumatic.      Right Ear: External ear normal.      Left Ear: External ear normal.      Nose: Nose normal.      Mouth/Throat:      Pharynx: No oropharyngeal exudate.   Eyes:      General:         Right eye: No discharge.         Left eye: No discharge.      Conjunctiva/sclera: Conjunctivae normal.      Pupils: Pupils are equal, round, and reactive to light.   Neck:      Thyroid: No thyromegaly.   Cardiovascular:      Rate and Rhythm: Normal rate and regular rhythm.      Heart sounds: Normal heart sounds. No murmur heard.  Pulmonary:      Effort: Pulmonary effort is normal.      Breath sounds: Normal breath sounds. No wheezing.   Abdominal:      General: Bowel sounds are normal.      Palpations: Abdomen is soft. There is no mass.      Tenderness: There is no abdominal tenderness.   Musculoskeletal:         General: No tenderness.      Cervical back: Normal range of motion and neck supple.      Comments: Has one foot in a boot due to achilles tendon thickening    Lymphadenopathy:      Cervical: No cervical adenopathy.   Skin:     General: Skin is dry.      Findings: No rash.   Neurological:      Mental Status: She is alert and oriented to person, place, and time.      Cranial Nerves: No cranial nerve deficit.      Motor: No abnormal muscle tone.      Coordination: Coordination normal.      Deep Tendon Reflexes: Reflexes are normal and symmetric. Reflexes normal.   Psychiatric:         Behavior: Behavior normal.         Thought Content: Thought content normal.         Judgment: Judgment normal.                ASSESSMENT/PLAN:     Return yearly for physicals  Follow up with dentist every 6 months  Follow up with eye doctor yearly  Recommend aerobic exercise for at least 30mins 5 days a week  Yearly flu shot  Tetanus booster every 10 years (Tdap/ Td)  Labs ordered/ or reviewed if done prior to appointment     Encounter Diagnoses   Name Primary?    Well adult exam Yes    Other iron  deficiency anemia     Hypercholesteremia     Iron deficiency anemia, unspecified iron deficiency anemia type     Hypothyroidism, unspecified type     Anxiety and depression     Adenoma of left adrenal gland     History of migraine headaches     Vitamin D deficiency     Menorrhagia with regular cycle     Encounter for screening mammogram for breast cancer        1. Well adult exam      2. Other iron deficiency anemia  Increase iron to twice a day, likely due to heavy menses  Recheck iron 2 months  - ONCOLOGY/HEMATOLOGY - INTERNAL    3. Hypercholesteremia  Slightly elevated  Low fat low cholesterol diet      4. Iron deficiency anemia, unspecified iron deficiency anemia type  Replace  If still low in 2 months to see hematology  - Ferrous Sulfate (FEROSUL) 325 (65 Fe) MG Oral Tab; Take 1 tablet (325 mg total) by mouth 2 (two) times daily.  Dispense: 180 tablet; Refill: 1  - CBC With Differential With Platelet; Future  - Iron And Tibc; Future    5. Hypothyroidism, unspecified type  Tsh very low  Patient will call us back with what dose she is taking  - TSH W Reflex To Free T4; Future    6. Anxiety and depression  Stable  Seeing psychiatry    7. Adenoma of left adrenal gland  Follow up endo     8. History of migraine headaches  Stable     9. Vitamin D deficiency  Replaced    10. Menorrhagia with regular cycle  Follow up gyne  - OBG - INTERNAL    11. Encounter for screening mammogram for breast cancer    - [facility] 2D+3D SCREENING BILAT (CPT=77067/31308); Future      Orders Placed This Encounter   Procedures    CBC With Differential With Platelet    Iron And Tibc    TSH W Reflex To Free T4       The above note was creating using Dragon speech recognition technology. Please excuse any typos    Meds This Visit:  Requested Prescriptions     Signed Prescriptions Disp Refills    valACYclovir 1 G Oral Tab 4 tablet 2     Sig: Take 2 tablets (2,000 mg total) by mouth 2 (two) times daily. FOR 1 DAY    Ferrous Sulfate (FEROSUL) 325  (65 Fe) MG Oral Tab 180 tablet 1     Sig: Take 1 tablet (325 mg total) by mouth 2 (two) times daily.       Imaging & Referrals:  ONCOLOGY/HEMATOLOGY - INTERNAL  OBG - INTERNAL  [facility] 2D+3D SCREENING BILJURGEN (CPT=[id number])       ID#[id number]

## 2024-07-30 RX ORDER — LEVOTHYROXINE SODIUM 137 UG/1
137 TABLET ORAL
Qty: 30 TABLET | Refills: 0 | Status: SHIPPED | OUTPATIENT
Start: 2024-07-30

## 2024-07-30 NOTE — TELEPHONE ENCOUNTER
Please Review. Protocol Failed; No Protocol   Lab Results   Component Value Date     TSH 0.051 (L) 04/15/2024      Requested Prescriptions   Pending Prescriptions Disp Refills    LEVOTHYROXINE 137 MCG Oral Tab [Pharmacy Med Name: Levothyroxine Sodium 137 MCG Oral Tablet] 90 tablet 0     Sig: TAKE 1 TABLET BY MOUTH ONCE DAILY BEFORE BREAKFAST . APPOINTMENT REQUIRED FOR FUTURE REFILLS       Thyroid Medication Protocol Failed - 7/26/2024  1:12 PM        Failed - Last TSH value is normal     Lab Results   Component Value Date    TSH 0.051 (L) 04/15/2024                 Passed - TSH in past 12 months        Passed - In person appointment or virtual visit in the past 12 mos or appointment in next 3 mos     Recent Outpatient Visits              [facility]    [facility]    1 year ago [facility] cervical cancer [facility]    [facility]    1 year ago [facility]    [facility] Visit    2 years ago [facility] adult exam    [facility]    Office Visit    2 years ago Other iron deficiency anemia    [facility] Hematology Oncology Zacarias Grubbs MD    Office Visit                               Recent Outpatient Visits              3 months ago [facility]    [facility]    1 year ago Pap smear for cervical cancer screening    [facility]    [facility]    1 year ago [facility] adult exam    [facility]    [facility]    2 years ago [facility]    Office Visit    2 years ago Other iron deficiency anemia    [facility] Hematology Oncology Zacarias Grubbs MD    Office Visit

## 2024-08-22 ENCOUNTER — LAB ENCOUNTER (OUTPATIENT)
Dept: LAB | Age: 54
End: 2024-08-22
Attending: FAMILY MEDICINE

## 2024-08-22 DIAGNOSIS — D50.9 IRON DEFICIENCY ANEMIA, UNSPECIFIED IRON DEFICIENCY ANEMIA TYPE: ICD-10-CM

## 2024-08-22 DIAGNOSIS — E03.9 HYPOTHYROIDISM, UNSPECIFIED TYPE: ICD-10-CM

## 2024-08-22 LAB
BASOPHILS # BLD AUTO: 0.07 X10(3) UL (ref 0–0.2)
BASOPHILS NFR BLD AUTO: 1.1 %
DEPRECATED RDW RBC AUTO: 41.9 FL (ref 35.1–46.3)
EOSINOPHIL # BLD AUTO: 0.15 X10(3) UL (ref 0–0.7)
EOSINOPHIL NFR BLD AUTO: 2.4 %
ERYTHROCYTE [DISTWIDTH] IN BLOOD BY AUTOMATED COUNT: 12.9 % (ref 11–15)
HCT VFR BLD AUTO: 44.9 %
HGB BLD-MCNC: 15.2 G/DL
IMM GRANULOCYTES # BLD AUTO: 0.02 X10(3) UL (ref 0–1)
IMM GRANULOCYTES NFR BLD: 0.3 %
IRON SATN MFR SERPL: 51 %
IRON SERPL-MCNC: 189 UG/DL
LYMPHOCYTES # BLD AUTO: 1.95 X10(3) UL (ref 1–4)
LYMPHOCYTES NFR BLD AUTO: 30.6 %
MCH RBC QN AUTO: 30 PG (ref 26–34)
MCHC RBC AUTO-ENTMCNC: 33.9 G/DL (ref 31–37)
MCV RBC AUTO: 88.6 FL
MONOCYTES # BLD AUTO: 0.4 X10(3) UL (ref 0.1–1)
MONOCYTES NFR BLD AUTO: 6.3 %
NEUTROPHILS # BLD AUTO: 3.79 X10 (3) UL (ref 1.5–7.7)
NEUTROPHILS # BLD AUTO: 3.79 X10(3) UL (ref 1.5–7.7)
NEUTROPHILS NFR BLD AUTO: 59.3 %
PLATELET # BLD AUTO: 285 10(3)UL (ref 150–450)
RBC # BLD AUTO: 5.07 X10(6)UL
T4 FREE SERPL-MCNC: 1.3 NG/DL (ref 0.8–1.7)
TIBC SERPL-MCNC: 368 UG/DL (ref 250–425)
TRANSFERRIN SERPL-MCNC: 247 MG/DL (ref 250–380)
TSI SER-ACNC: 7.47 MIU/ML (ref 0.55–4.78)
WBC # BLD AUTO: 6.4 X10(3) UL (ref 4–11)

## 2024-08-22 PROCEDURE — 85025 COMPLETE CBC W/AUTO DIFF WBC: CPT

## 2024-08-22 PROCEDURE — 84443 ASSAY THYROID STIM HORMONE: CPT

## 2024-08-22 PROCEDURE — 84466 ASSAY OF TRANSFERRIN: CPT

## 2024-08-22 PROCEDURE — 83540 ASSAY OF IRON: CPT

## 2024-08-22 PROCEDURE — 84439 ASSAY OF FREE THYROXINE: CPT

## 2024-08-22 PROCEDURE — 36415 COLL VENOUS BLD VENIPUNCTURE: CPT

## 2024-09-09 ENCOUNTER — TELEPHONE (OUTPATIENT)
Dept: FAMILY MEDICINE CLINIC | Facility: CLINIC | Age: 54
End: 2024-09-09

## 2024-09-09 ENCOUNTER — VIRTUAL PHONE E/M (OUTPATIENT)
Dept: FAMILY MEDICINE CLINIC | Facility: CLINIC | Age: 54
End: 2024-09-09

## 2024-09-09 DIAGNOSIS — E03.9 HYPOTHYROIDISM, UNSPECIFIED TYPE: Primary | ICD-10-CM

## 2024-09-09 DIAGNOSIS — D50.9 IRON DEFICIENCY ANEMIA, UNSPECIFIED IRON DEFICIENCY ANEMIA TYPE: ICD-10-CM

## 2024-09-09 RX ORDER — LEVOTHYROXINE SODIUM 150 UG/1
150 TABLET ORAL
Qty: 90 TABLET | Refills: 0 | Status: SHIPPED | OUTPATIENT
Start: 2024-09-09

## 2024-09-09 RX ORDER — FERROUS SULFATE 325(65) MG
TABLET ORAL
Qty: 180 TABLET | Refills: 0 | Status: SHIPPED | OUTPATIENT
Start: 2024-09-09

## 2024-09-09 NOTE — TELEPHONE ENCOUNTER
4:15 PM patient calling for status update on call from PCP for virtual phone visit at 4 PM and mentions a call was coming in at 3:41 PM but appt is at 4 PM     Attempted to call site.    Please advise.

## 2024-09-09 NOTE — PROGRESS NOTES
Dorcas Salas  or legal guardian ,verbally consents to a Virtual/Telephone Check-In visit on 05/26/20.  Patient has been referred to the [facility] website at [email or web address] to review the yearly Consent to Treat document.    Patient understands and accepts financial responsibility for any deductible, co-insurance and/or co-pays associated with this service.      HPI:   Dorcas Salas is a 54 year old female.      Chief Complaint   Patient presents with    Lab Results     Component      Latest Ref Rng 2/24/2023 4/15/2024 8/22/2024   WBC      4.0 - 11.0 x10(3) uL 6.0   6.4    RBC      3.80 - 5.30 x10(6)uL 4.55   5.07    Hemoglobin      12.0 - 16.0 g/dL 13.8   15.2    Hematocrit      35.0 - 48.0 % 40.5   44.9    MCV      80.0 - 100.0 fL 89.0   88.6    MCH      26.0 - 34.0 pg 30.3   30.0    MCHC      31.0 - 37.0 g/dL 34.1   33.9    RDW-SD      35.1 - 46.3 fL 39.9   41.9    RDW      11.0 - 15.0 % 12.2   12.9    Platelet Count      150.0 - 450.0 10(3)uL 285.0   285.0    Prelim Neutrophil Abs      1.50 - 7.70 x10 (3) uL 3.68   3.79    Neutrophils Absolute      1.50 - 7.70 x10(3) uL 3.68   3.79    Lymphocytes Absolute      1.00 - 4.00 x10(3) uL 1.64   1.95    Monocytes Absolute      0.10 - 1.00 x10(3) uL 0.52   0.40    Eosinophils Absolute      0.00 - 0.70 x10(3) uL 0.08   0.15    Basophils Absolute      0.00 - 0.20 x10(3) uL 0.06   0.07    Immature Granulocyte Absolute      0.00 - 1.00 x10(3) uL 0.02   0.02    Neutrophils %      % 61.4   59.3    Lymphocytes %      % 27.3   30.6    Monocytes %      % 8.7   6.3    Eosinophils %      % 1.3   2.4    Basophils %      % 1.0   1.1    Immature Granulocyte %      % 0.3   0.3    Iron, Serum      50 - 170 ug/dL 43 (L)  38 (L)  189 (H)    Transferrin      250 - 380 mg/dL 306  237 (L)  247 (L)    Iron Bind.Cap.(TIBC)      250 - 425 ug/dL 456 (H)  353  368    Iron Saturation      15 - 50 % 9 (L)  11 (L)  51 (H)    TSH      0.550 - 4.780 mIU/mL 2.550  0.051 (L)  7.471 (H)     T4,Free (Direct)      0.8 - 1.7 ng/dL  1.7  1.3    T3 FREE      2.40 - 4.20 pg/mL  3.08        Legend:  (L) Low  (H) High    Patient was very confused about her test results as she states that her iron levels were low with taking iron once daily and then she saw a hematologist as well.  She increase her iron intake to twice daily.  She states that she was told to stop the iron she is worried as she experienced hair loss when she had low iron levels.      Also she has had difficult time managing her thyroid results.  She was taking 2 pills of levothyroxine 137 mcg daily.  This was an error.  The dose was reduced to 1 tablet daily and now her levels are slightly higher.        ROS  A comprehensive 10 point review of systems was completed.  Pertinent positives and negatives noted in the the HPI.      PATIENTS DENIES ANY KNOWN EXPOSURE TO ANYONE CONFIRMED FOR COVID 19.      Patient Active Problem List   Diagnosis    Iron deficiency anemia    Bilateral hearing loss    Mixed hyperlipidemia    Urinary urgency    History of migraine headaches    History of herniated intervertebral disc    Liver lesion, right lobe    Adenoma of left adrenal gland    Anxiety and depression    Hypercholesteremia    Hypothyroidism    Obesity (BMI 30.0-34.9)    History of alcoholism (HCC)    Hearing aid worn    Hair loss    Irregular bowel habits    Abdominal cramping           MEDICATION LIST    Current Outpatient Medications:     levothyroxine 150 MCG Oral Tab, Take 1 tablet (150 mcg total) by mouth before breakfast., Disp: 90 tablet, Rfl: 0    Ferrous Sulfate (FEROSUL) 325 (65 Fe) MG Oral Tab, Take one tab daily  Mondays to Thurdays and 1 tab twice a day Friday, sat, Sunday, Disp: 180 tablet, Rfl: 0    levothyroxine 137 MCG Oral Tab, Take 137 mcg by mouth before breakfast. Please complete retesting of thyroid labs since last level was low, Disp: 30 tablet, Rfl: 0    escitalopram (LEXAPRO) 20 MG Oral Tab, Take 1 tablet (20 mg total) by mouth  daily., Disp: 90 tablet, Rfl: 0    lamoTRIgine (LAMICTAL) 200 MG Oral Tab, Take 1 tablet (200 mg total) by mouth 2 (two) times daily., Disp: 180 tablet, Rfl: 0    lurasidone (LATUDA) 40 MG Oral Tab, Take 1 tablet (40 mg total) by mouth daily with dinner., Disp: 30 tablet, Rfl: 2    valACYclovir 1 G Oral Tab, Take 2 tablets (2,000 mg total) by mouth 2 (two) times daily. FOR 1 DAY, Disp: 4 tablet, Rfl: 2    atorvastatin 20 MG Oral Tab, Take 1 tablet (20 mg total) by mouth daily., Disp: 90 tablet, Rfl: 3    amitriptyline 25 MG Oral Tab, amitriptyline 25 mg tablet  TAKE 1 TABLET BY MOUTH EVERY DAY AT BEDTIME (Patient not taking: Reported on 4/16/2024), Disp: , Rfl:     Multiple Vitamins-Minerals (BIOTIN PLUS/CALCIUM/VIT D3) Oral Tab, Take by mouth. (Patient not taking: Reported on 4/16/2024), Disp: , Rfl:     AIMOVIG 70 MG/ML Subcutaneous, , Disp: , Rfl:     gabapentin 300 MG Oral Cap, Take 2 capsules (600 mg total) by mouth 2 (two) times a day. 300 MG tablet twice a day, Disp: 120 capsule, Rfl: 5    Rizatriptan Benzoate (MAXALT) 10 MG Oral Tab, Take 1 tablet (10 mg total) by mouth as needed for Migraine., Disp: 9 tablet, Rfl: 11    Lysine HCl 1000 MG Oral Tab, Take 1 tablet by mouth., Disp: , Rfl:     aspirin 81 MG Oral Tab, Take 1 tablet (81 mg total) by mouth daily., Disp: , Rfl:     Cholecalciferol (VITAMIN D) 1000 UNITS Oral Cap, Take 2,000 Units by mouth daily.  , Disp: , Rfl:     ALLERGY LIST  Allergies   Allergen Reactions    Cortisporin [Neomycin-Polymyxin-Hc] HIVES    Codeine [Opioid Analgesics] ITCHING    Vicodin [Hydrocodone-Acetaminophen] ITCHING    Vosol Hc [Hydrocortisone-Acetic Acid] ITCHING       Allergies:  Allergies   Allergen Reactions    Cortisporin [Neomycin-Polymyxin-Hc] HIVES    Codeine [Opioid Analgesics] ITCHING    Vicodin [Hydrocodone-Acetaminophen] ITCHING    Vosol Hc [Hydrocortisone-Acetic Acid] ITCHING       PHYSICAL EXAM:     Vitals signs taken at home if available:    LMP 04/10/2023  (Approximate)     Limited examination for this telephone/ video visit due to the coronavirus emergency    Patient was speaking in complete sentences, no increased work of breathing and very coherent and alert on the phone.  Alert and oriented x 3  Patient was responding to questions appropriately.  Patient did not sound short of breath.      ASSESSMENT/PLAN:     Encounter Diagnoses   Name Primary?    Iron deficiency anemia, unspecified iron deficiency anemia type     Hypothyroidism, unspecified type Yes       1. Iron deficiency anemia, unspecified iron deficiency anemia type  Would recommend decreasing dose a little  Recheck iron levels 6-8 weeks  Patient agreeable to this   - Ferrous Sulfate (FEROSUL) 325 (65 Fe) MG Oral Tab; Take one tab daily  Mondays to Thurdays and 1 tab twice a day Friday, sat, Prashant  Dispense: 180 tablet; Refill: 0  - Iron And Tibc; Future    2. Hypothyroidism, unspecified type  Recommend increasing levothyroxine to 150 mcg daily.  Recommend rechecking thyroid levels in the next 6 to 8 weeks.  - TSH W Reflex To Free T4; Future      Orders Placed This Encounter   Procedures    TSH W Reflex To Free T4    Iron And Tibc       Meds This Visit:  Requested Prescriptions     Signed Prescriptions Disp Refills    levothyroxine 150 MCG Oral Tab 90 tablet 0     Sig: Take 1 tablet (150 mcg total) by mouth before breakfast.    Ferrous Sulfate (FEROSUL) 325 (65 Fe) MG Oral Tab 180 tablet 0     Sig: Take one tab daily  Mondays to Thurdays and 1 tab twice a day Friday, sat, Prashant       Imaging & Referrals:  None      Patient reminded to practice good health and safety measures including washing hands, social distancing, covering mouth when coughing/ sneezing, avoid social meetings/ gatherings in face of this Covid 19 pandemic.    Patient verbalized understanding of plan and all questions answered to the best of my ability.    Patient to call back if any change/ worsening of symptoms.    Duration of the  service: 7 mins 48 secs    Syed Neal MD  9/9/2024  4:27 PM

## 2024-12-09 DIAGNOSIS — E03.9 HYPOTHYROIDISM, UNSPECIFIED TYPE: ICD-10-CM

## 2024-12-13 RX ORDER — LEVOTHYROXINE SODIUM 150 UG/1
150 TABLET ORAL
Qty: 90 TABLET | Refills: 0 | Status: SHIPPED | OUTPATIENT
Start: 2024-12-13

## 2024-12-13 NOTE — TELEPHONE ENCOUNTER
Please review; protocol failed/ has no protocol    Requested Prescriptions   Pending Prescriptions Disp Refills    LEVOTHYROXINE 150 MCG Oral Tab [Pharmacy Med Name: Levothyroxine Sodium 150 MCG Oral Tablet] 90 tablet 0     Sig: TAKE 1 TABLET BY MOUTH ONCE DAILY BEFORE BREAKFAST       Thyroid Medication Protocol Failed - 12/13/2024 11:46 AM        Failed - Last TSH value is normal     Lab Results   Component Value Date    TSH 7.471 (H) 08/22/2024                 Passed - TSH in past 12 months        Passed - In person appointment or virtual visit in the past 12 mos or appointment in next 3 mos     Recent Outpatient Visits              [facility], [facility]    [facility]    8 months ago [facility]    [facility] Visit    1 year ago [facility] smear [facility] cervical [facility]    [facility]    1 year ago [facility] adult exam    [facility]    [facility]    3 years ago [facility] exam    [facility]    [facility]                         Recent Outpatient Visits              3 months ago Hypothyroidism, unspecified type    [facility]    [facility] E/M    8 months ago [facility] adult exam    [facility]    [facility] Visit    1 year ago Pap smear [facility] cervical cancer [facility]    [facility]    1 year ago [facility] adult exam    [facility]    [facility]    3 years ago Well adult exam    [place]  [facility], [facility] Syed Neal MD    Office Visit

## 2025-01-06 RX ORDER — VALACYCLOVIR HYDROCHLORIDE 1 G/1
2000 TABLET, FILM COATED ORAL 2 TIMES DAILY
Qty: 4 TABLET | Refills: 1 | Status: SHIPPED | OUTPATIENT
Start: 2025-01-06

## 2025-01-06 NOTE — TELEPHONE ENCOUNTER
Refill passed per [facility] protocols.    Requested Prescriptions   Pending Prescriptions Disp Refills    VALACYCLOVIR 1 G Oral Tab [Pharmacy Med Name: valACYclovir HCl 1 GM Oral Tablet] 4 tablet 1     Sig: TAKE 2 TABLETS BY MOUTH TWICE DAILY FOR 1 DAY       Herpes Agent Protocol Passed - 1/6/2025  5:40 PM        Passed - In person appointment or virtual visit in the past 12 mos or appointment in next 3 mos     Recent Outpatient Visits              3 months ago Hypothyroidism, unspecified type    [facility]    [facility] E/M    8 months ago [facility]    [facility] Visit    1 year ago Pap smear for cervical cancer screening    [facility]    Office Visit    1 year ago Well adult exam    [facility]    Office Visit    3 years ago Well adult exam    [facility]    Office Visit

## 2025-04-15 DIAGNOSIS — E03.9 HYPOTHYROIDISM, UNSPECIFIED TYPE: ICD-10-CM

## 2025-04-18 RX ORDER — LEVOTHYROXINE SODIUM 150 UG/1
TABLET ORAL
Qty: 30 TABLET | Refills: 0 | Status: SHIPPED | OUTPATIENT
Start: 2025-04-18

## 2025-04-21 ENCOUNTER — MED REC SCAN ONLY (OUTPATIENT)
Dept: FAMILY MEDICINE CLINIC | Facility: CLINIC | Age: 55
End: 2025-04-21

## 2025-05-03 DIAGNOSIS — E78.00 HYPERCHOLESTEREMIA: ICD-10-CM

## 2025-05-05 RX ORDER — ATORVASTATIN CALCIUM 20 MG/1
20 TABLET, FILM COATED ORAL DAILY
Qty: 30 TABLET | Refills: 0 | Status: SHIPPED | OUTPATIENT
Start: 2025-05-05

## 2025-05-08 ENCOUNTER — TELEPHONE (OUTPATIENT)
Dept: FAMILY MEDICINE CLINIC | Facility: CLINIC | Age: 55
End: 2025-05-08

## 2025-05-08 DIAGNOSIS — Z00.00 WELL ADULT EXAM: Primary | ICD-10-CM

## 2025-05-08 DIAGNOSIS — D50.9 IRON DEFICIENCY ANEMIA, UNSPECIFIED IRON DEFICIENCY ANEMIA TYPE: ICD-10-CM

## 2025-05-08 NOTE — TELEPHONE ENCOUNTER
Patient, scheduled an appointment to see Dr. Neal on 6-10-25 for her physical. Patient would like to know if the doctor would give her orders to do her blood work before her appointment. Please, call the patient when the orders are entered.

## 2025-05-09 NOTE — TELEPHONE ENCOUNTER
Left message that these have been placed for her, also sent mychart to inform.  If questions, patient to call us back.

## 2025-05-16 DIAGNOSIS — E03.9 HYPOTHYROIDISM, UNSPECIFIED TYPE: ICD-10-CM

## 2025-05-16 NOTE — TELEPHONE ENCOUNTER
Please Review. Protocol Failed; No Protocol     Requested Prescriptions   Pending Prescriptions Disp Refills    LEVOTHYROXINE 150 MCG Oral Tab [Pharmacy Med Name: Levothyroxine Sodium 150 MCG Oral Tablet] 30 tablet 0     Sig: TAKE 1 TABLET BY MOUTH ONCE DAILY BEFORE BREAKFAST. PLEASE COMPLETE THYROID BLOOD TEST.       Thyroid Medication Protocol Failed - 5/16/2025  2:44 PM        Failed - Last TSH value is normal     Lab Results   Component Value Date    TSH 7.471 (H) 08/22/2024

## 2025-05-17 RX ORDER — LEVOTHYROXINE SODIUM 150 UG/1
TABLET ORAL
Qty: 30 TABLET | Refills: 0 | Status: SHIPPED | OUTPATIENT
Start: 2025-05-17

## 2025-06-03 LAB
% SATURATION: 18 % (CALC) (ref 16–45)
ABSOLUTE BASOPHILS: 40 CELLS/UL (ref 0–200)
ABSOLUTE EOSINOPHILS: 91 CELLS/UL (ref 15–500)
ABSOLUTE LYMPHOCYTES: 1739 CELLS/UL (ref 850–3900)
ABSOLUTE MONOCYTES: 462 CELLS/UL (ref 200–950)
ABSOLUTE NEUTROPHILS: 3369 CELLS/UL (ref 1500–7800)
ALBUMIN/GLOBULIN RATIO: 1.5 (CALC) (ref 1–2.5)
ALBUMIN: 4.2 G/DL (ref 3.6–5.1)
ALKALINE PHOSPHATASE: 84 U/L (ref 37–153)
ALT: 22 U/L (ref 6–29)
AST: 23 U/L (ref 10–35)
BASOPHILS: 0.7 %
BILIRUBIN, TOTAL: 0.4 MG/DL (ref 0.2–1.2)
BUN/CREATININE RATIO: 16 (CALC) (ref 6–22)
BUN: 17 MG/DL (ref 7–25)
CALCIUM: 9.1 MG/DL (ref 8.6–10.4)
CARBON DIOXIDE: 26 MMOL/L (ref 20–32)
CHLORIDE: 104 MMOL/L (ref 98–110)
CREATININE: 1.05 MG/DL (ref 0.5–1.03)
EGFR: 63 ML/MIN/1.73M2
EOSINOPHILS: 1.6 %
GLOBULIN: 2.8 G/DL (CALC) (ref 1.9–3.7)
GLUCOSE: 93 MG/DL (ref 65–99)
HEMATOCRIT: 42.7 % (ref 35–45)
HEMOGLOBIN A1C: 5.2 %
HEMOGLOBIN: 13.8 G/DL (ref 11.7–15.5)
IRON BINDING CAPACITY: 361 MCG/DL (CALC) (ref 250–450)
IRON, TOTAL: 64 MCG/DL (ref 45–160)
LYMPHOCYTES: 30.5 %
MCH: 30.4 PG (ref 27–33)
MCHC: 32.3 G/DL (ref 32–36)
MCV: 94.1 FL (ref 80–100)
MONOCYTES: 8.1 %
MPV: 10 FL (ref 7.5–12.5)
NEUTROPHILS: 59.1 %
PLATELET COUNT: 301 THOUSAND/UL (ref 140–400)
POTASSIUM: 4.5 MMOL/L (ref 3.5–5.3)
PROTEIN, TOTAL: 7 G/DL (ref 6.1–8.1)
RDW: 12.5 % (ref 11–15)
RED BLOOD CELL COUNT: 4.54 MILLION/UL (ref 3.8–5.1)
SODIUM: 139 MMOL/L (ref 135–146)
TSH W/REFLEX TO FT4: 1.71 MIU/L
WHITE BLOOD CELL COUNT: 5.7 THOUSAND/UL (ref 3.8–10.8)

## 2025-06-06 LAB
CHOL/HDLC RATIO: 3.8 (CALC)
CHOLESTEROL, TOTAL: 204 MG/DL
HDL CHOLESTEROL: 54 MG/DL
LDL-CHOLESTEROL: 128 MG/DL (CALC)
NON-HDL CHOLESTEROL: 150 MG/DL (CALC)
TRIGLYCERIDES: 114 MG/DL

## 2025-06-10 ENCOUNTER — OFFICE VISIT (OUTPATIENT)
Dept: FAMILY MEDICINE CLINIC | Facility: CLINIC | Age: 55
End: 2025-06-10

## 2025-06-10 VITALS
SYSTOLIC BLOOD PRESSURE: 126 MMHG | WEIGHT: 186 LBS | BODY MASS INDEX: 29.19 KG/M2 | DIASTOLIC BLOOD PRESSURE: 79 MMHG | TEMPERATURE: 98 F | HEIGHT: 67 IN | HEART RATE: 76 BPM

## 2025-06-10 DIAGNOSIS — F32.A ANXIETY AND DEPRESSION: ICD-10-CM

## 2025-06-10 DIAGNOSIS — E03.9 HYPOTHYROIDISM, UNSPECIFIED TYPE: ICD-10-CM

## 2025-06-10 DIAGNOSIS — Z97.4 HEARING AID WORN: ICD-10-CM

## 2025-06-10 DIAGNOSIS — D35.02 ADENOMA OF LEFT ADRENAL GLAND: ICD-10-CM

## 2025-06-10 DIAGNOSIS — Z00.00 WELL ADULT EXAM: Primary | ICD-10-CM

## 2025-06-10 DIAGNOSIS — D18.03 HEPATIC HEMANGIOMA: ICD-10-CM

## 2025-06-10 DIAGNOSIS — Z86.19 HISTORY OF COLD SORES: ICD-10-CM

## 2025-06-10 DIAGNOSIS — Z59.41 FOOD INSECURITY: ICD-10-CM

## 2025-06-10 DIAGNOSIS — D50.9 IRON DEFICIENCY ANEMIA, UNSPECIFIED IRON DEFICIENCY ANEMIA TYPE: ICD-10-CM

## 2025-06-10 DIAGNOSIS — E78.00 HYPERCHOLESTEREMIA: ICD-10-CM

## 2025-06-10 DIAGNOSIS — F41.9 ANXIETY AND DEPRESSION: ICD-10-CM

## 2025-06-10 PROCEDURE — 99213 OFFICE O/P EST LOW 20 MIN: CPT | Performed by: FAMILY MEDICINE

## 2025-06-10 PROCEDURE — 99396 PREV VISIT EST AGE 40-64: CPT | Performed by: FAMILY MEDICINE

## 2025-06-10 RX ORDER — VALACYCLOVIR HYDROCHLORIDE 1 G/1
2000 TABLET, FILM COATED ORAL 2 TIMES DAILY
Qty: 4 TABLET | Refills: 2 | Status: SHIPPED | OUTPATIENT
Start: 2025-06-10

## 2025-06-10 RX ORDER — FERROUS SULFATE 325(65) MG
TABLET ORAL
Qty: 180 TABLET | Refills: 1 | Status: SHIPPED | OUTPATIENT
Start: 2025-06-10

## 2025-06-10 RX ORDER — ATORVASTATIN CALCIUM 20 MG/1
20 TABLET, FILM COATED ORAL DAILY
Qty: 90 TABLET | Refills: 3 | Status: SHIPPED | OUTPATIENT
Start: 2025-06-10

## 2025-06-10 SDOH — ECONOMIC STABILITY - FOOD INSECURITY: FOOD INSECURITY: Z59.41

## 2025-06-10 NOTE — PROGRESS NOTES
HPI:    Patient ID: Dorcas Salas is a 55 year old female.    HPI  Chief Complaint   Patient presents with    Routine Physical     Sees gyne        Wt Readings from Last 6 Encounters:   06/10/25 186 lb (84.4 kg)   04/16/24 189 lb (85.7 kg)   04/17/23 193 lb (87.5 kg)   02/27/23 192 lb (87.1 kg)   06/16/22 194 lb (88 kg)   09/09/21 194 lb (88 kg)     BP Readings from Last 3 Encounters:   06/10/25 126/79   04/16/24 134/90   04/17/23 126/84     Getting period monthly.  Will be seeing endo and gyne soon.  Mood stable  Non smoking.  Losing weight, watching diet, and exercising more       Review of Systems   Constitutional:  Negative for activity change, appetite change, chills, fatigue, fever and unexpected weight change.   HENT:  Negative for congestion, dental problem, drooling, ear discharge, ear pain, facial swelling, hearing loss, mouth sores, nosebleeds, postnasal drip, rhinorrhea, sinus pressure, sinus pain, sneezing, sore throat, tinnitus, trouble swallowing and voice change.    Eyes:  Negative for pain, discharge, redness and visual disturbance.   Respiratory:  Negative for cough, shortness of breath and wheezing.    Cardiovascular:  Negative for chest pain, palpitations and leg swelling.   Gastrointestinal:  Negative for abdominal pain, anal bleeding, blood in stool, constipation, diarrhea, nausea, rectal pain and vomiting.   Endocrine: Negative for cold intolerance, heat intolerance, polydipsia, polyphagia and polyuria.   Genitourinary:  Negative for decreased urine volume, difficulty urinating, dysuria, flank pain, frequency, menstrual problem, pelvic pain, urgency, vaginal bleeding, vaginal discharge and vaginal pain.        Periods are regular     Musculoskeletal:  Negative for arthralgias, back pain and myalgias.   Skin:  Negative for rash.   Neurological:  Negative for dizziness, seizures, syncope, weakness, numbness and headaches.   Hematological:  Does not bruise/bleed easily.    Psychiatric/Behavioral:  Negative for behavioral problems, decreased concentration, self-injury, sleep disturbance and suicidal ideas. The patient is not nervous/anxious.        /79   Pulse 76   Temp 97.7 °F (36.5 °C) (Temporal)   Ht 5' 7\" (1.702 m)   Wt 186 lb (84.4 kg)   LMP 05/15/2025 (Exact Date)   BMI 29.13 kg/m²     Past Medical History[1]  Past Surgical History[2]  Social History     Socioeconomic History    Marital status: Single     Spouse name: Not on file    Number of children: 0    Years of education: Not on file    Highest education level: Not on file   Occupational History    Occupation:    Tobacco Use    Smoking status: Former     Current packs/day: 0.00     Average packs/day: 0.5 packs/day for 20.0 years (10.0 ttl pk-yrs)     Types: Cigarettes     Start date: 1984     Quit date: 2004     Years since quittin.0    Smokeless tobacco: Never   Vaping Use    Vaping status: Never Used   Substance and Sexual Activity    Alcohol use: Yes     Comment: social     Drug use: No    Sexual activity: Not on file   Other Topics Concern     Service Not Asked    Blood Transfusions Not Asked    Caffeine Concern Yes    Occupational Exposure Not Asked    Hobby Hazards Not Asked    Sleep Concern Not Asked    Stress Concern Not Asked    Weight Concern Not Asked    Special Diet Not Asked    Back Care Not Asked    Exercise No    Bike Helmet Not Asked    Seat Belt Not Asked    Self-Exams Not Asked   Social History Narrative    Irma is single. She has no children. Patient works as a . She lives in [place], [place]     Social Drivers of Health     Food Insecurity: Food Insecurity Present (6/10/2025)    NCSS - Food Insecurity     Worried About Running Out of Food in the Last Year: Yes     Ran Out of Food in the Last Year: No   Transportation Needs: No Transportation Needs (6/10/2025)    NCSS - Transportation     Lack of Transportation: No   Stress: Not on file   Housing  Stability: Not At Risk (6/10/2025)    NCSS - Housing/Utilities     Has Housing: Yes     Worried About Losing Housing: No     Unable to Get Utilities: No     Family History[3]    Immunization History   Administered Date(s) Administered    Covid-19 Vaccine Pfizer 30 mcg/0.3 ml 10/18/2021, 11/08/2021    Covid-19 Vaccine Pfizer Kel-Sucrose 30 mcg/0.3 ml 04/24/2022    Influenza 11/19/2018    TDAP 02/18/2013, 02/27/2023       Health Maintenance   Topic Date Due    Pneumococcal Vaccine: 50+ Years (1 of 1 - PCV) Never done    Zoster Vaccines (1 of 2) Never done    Mammogram  04/22/2024    COVID-19 Vaccine (4 - 2024-25 season) 09/01/2024    Annual Physical  04/16/2025    Influenza Vaccine (Season Ended) 10/01/2025    Pap Smear  04/17/2026    Colorectal Cancer Screening  12/24/2029    DTaP,Tdap,and Td Vaccines (3 - Td or Tdap) 02/27/2033    Annual Depression Screening  Completed    Meningococcal B Vaccine  Aged Out        Current Medications[4]  Allergies:Allergies[5]   PHYSICAL EXAM:     Chief Complaint   Patient presents with    Routine Physical     Sees gyne       Physical Exam  Vitals and nursing note reviewed.   Constitutional:       Appearance: She is well-developed.   HENT:      Head: Normocephalic and atraumatic.      Right Ear: External ear normal.      Left Ear: External ear normal.      Nose: Nose normal.      Mouth/Throat:      Pharynx: No oropharyngeal exudate.   Eyes:      General:         Right eye: No discharge.         Left eye: No discharge.      Conjunctiva/sclera: Conjunctivae normal.      Pupils: Pupils are equal, round, and reactive to light.   Neck:      Thyroid: No thyromegaly.   Cardiovascular:      Rate and Rhythm: Normal rate and regular rhythm.      Heart sounds: Normal heart sounds. No murmur heard.  Pulmonary:      Effort: Pulmonary effort is normal.      Breath sounds: Normal breath sounds. No wheezing.   Abdominal:      General: Bowel sounds are normal.      Palpations: Abdomen is soft. There  is no mass.      Tenderness: There is no abdominal tenderness.   Musculoskeletal:         General: No tenderness.      Cervical back: Normal range of motion and neck supple.   Lymphadenopathy:      Cervical: No cervical adenopathy.   Skin:     General: Skin is dry.      Findings: No rash.   Neurological:      Mental Status: She is alert and oriented to person, place, and time.      Cranial Nerves: No cranial nerve deficit.      Motor: No abnormal muscle tone.      Coordination: Coordination normal.      Deep Tendon Reflexes: Reflexes are normal and symmetric. Reflexes normal.   Psychiatric:         Behavior: Behavior normal.         Thought Content: Thought content normal.         Judgment: Judgment normal.                ASSESSMENT/PLAN:     Return yearly for physicals  Follow up with dentist every 6 months  Follow up with eye doctor yearly  Recommend aerobic exercise for at least 30mins 5 days a week  Yearly flu shot  Tetanus booster every 10 years (Tdap/ Td)  Labs ordered/ or reviewed if done prior to appointment     Encounter Diagnoses   Name Primary?    Well adult exam Yes    Hypercholesteremia     Hypothyroidism, unspecified type     Iron deficiency anemia, unspecified iron deficiency anemia type     Anxiety and depression     Adenoma of left adrenal gland     Hepatic hemangioma     History of cold sores     Hearing aid worn        1. Well adult exam  Labs done and reviewed    2. Hypercholesteremia  Sligthly higher  Continue present management   Stable condition  Reviewed medications  Continue current medication management       - atorvastatin 20 MG Oral Tab; Take 1 tablet (20 mg total) by mouth daily.  Dispense: 90 tablet; Refill: 3    3. Hypothyroidism, unspecified type  Seeing endo soon  Stable condition  Reviewed medications  Continue current medication management       4. Iron deficiency anemia, unspecified iron deficiency anemia type  Stable condition  Reviewed medications  Continue current medication  management     - Ferrous Sulfate (FEROSUL) 325 (65 Fe) MG Oral Tab; Take one tab daily  Mondays to Thurdays and 1 tab twice a day Friday, sat, Prashant  Dispense: 180 tablet; Refill: 1    5. Anxiety and depression  Stable  Seeing psychiatry    6. Adenoma of left adrenal gland  Follow up endo     7. Hepatic hemangioma  stable    8. History of cold sores    - valACYclovir 1 G Oral Tab; Take 2 tablets (2,000 mg total) by mouth 2 (two) times daily.  Dispense: 4 tablet; Refill: 2    9. Hearing aid worn        No orders of the defined types were placed in this encounter.      The above note was creating using Dragon speech recognition technology. Please excuse any typos    Meds This Visit:  Requested Prescriptions     Signed Prescriptions Disp Refills    atorvastatin 20 MG Oral Tab 90 tablet 3     Sig: Take 1 tablet (20 mg total) by mouth daily.    Ferrous Sulfate (FEROSUL) 325 (65 Fe) MG Oral Tab 180 tablet 1     Sig: Take one tab daily  Mondays to Thurdays and 1 tab twice a day Friday, sat, Prashant    valACYclovir 1 G Oral Tab 4 tablet 2     Sig: Take 2 tablets (2,000 mg total) by mouth 2 (two) times daily.       Imaging & Referrals:  None       ID#[id number]       [1]   Past Medical History:   Adrenal adenoma, left    ANEMIA    iron deficiency since being a teenager    Depression    sees Dr. Glez    Hearing loss    pt has bilat hearing aids    Herpes labialis    HNP (herniated nucleus pulposus), cervical    C7    Hyperlipidemia    Hypothyroidism    Migraines    Neurologist prescribing medication   [2]   Past Surgical History:  Procedure Laterality Date    Back surgery  06/2010    cervical lami C6-7, fusion with titanium cage, no bleeding complications    D & c  2002    Other surgical history  2004    bunionectomy bilat   [3]   Family History  Problem Relation Age of Onset    Hypertension Mother     Other (primary biliary cirrhosis) Mother     Hypertension Father     Lipids Father     Heart Disorder Father         aortic valve  replacement, CHF    Other (Other) Father     Other (dementia) Father     Other (CVA's starting late 30's) Maternal Grandmother     Cancer Paternal Grandmother 75        lung ca; tobacco user    Heart Disorder Paternal Grandfather         MI    Heart Disorder Paternal Uncle 60        CHF, CAD, PVD    Heart Disorder Paternal Uncle 65         pacemaker, PVD    Cancer Paternal Uncle 85        unknown type    Bleeding Disorders Neg     Clotting Disorder Neg     Breast Cancer Neg     Ovarian Cancer Neg    [4]   Current Outpatient Medications   Medication Sig Dispense Refill    B Complex Vitamins (VITAMIN B COMPLEX OR) Inject as directed.      atorvastatin 20 MG Oral Tab Take 1 tablet (20 mg total) by mouth daily. 90 tablet 3    Ferrous Sulfate (FEROSUL) 325 (65 Fe) MG Oral Tab Take one tab daily  Mondays to Thurdays and 1 tab twice a day Friday, sat, Sunday 180 tablet 1    valACYclovir 1 G Oral Tab Take 2 tablets (2,000 mg total) by mouth 2 (two) times daily. 4 tablet 2    levothyroxine 150 MCG Oral Tab TAKE 1 TABLET BY MOUTH ONCE DAILY BEFORE BREAKFAST. 30 tablet 0    escitalopram (LEXAPRO) 20 MG Oral Tab Take 1 tablet (20 mg total) by mouth daily. 90 tablet 0    lamoTRIgine (LAMICTAL) 200 MG Oral Tab Take 1 tablet (200 mg total) by mouth 2 (two) times daily. 180 tablet 0    lurasidone (LATUDA) 40 MG Oral Tab Take 1 tablet (40 mg total) by mouth daily with dinner. 30 tablet 2    levothyroxine 137 MCG Oral Tab Take 137 mcg by mouth before breakfast. Please complete retesting of thyroid labs since last level was low 30 tablet 0    amitriptyline 25 MG Oral Tab amitriptyline 25 mg tablet   TAKE 1 TABLET BY MOUTH EVERY DAY AT BEDTIME      Multiple Vitamins-Minerals (BIOTIN PLUS/CALCIUM/VIT D3) Oral Tab Take by mouth.      gabapentin 300 MG Oral Cap Take 2 capsules (600 mg total) by mouth 2 (two) times a day. 300 MG tablet twice a day 120 capsule 5    Rizatriptan Benzoate (MAXALT) 10 MG Oral Tab Take 1 tablet (10 mg total) by  mouth as needed for Migraine. 9 tablet 11    Lysine HCl 1000 MG Oral Tab Take 1 tablet by mouth.      aspirin 81 MG Oral Tab Take 1 tablet (81 mg total) by mouth daily.      Cholecalciferol (VITAMIN D) 1000 UNITS Oral Cap Take 2,000 Units by mouth daily.       [5]   Allergies  Allergen Reactions    Cortisporin [Neomycin-Polymyxin-Hc] HIVES    Codeine [Opioid Analgesics] ITCHING    Vicodin [Hydrocodone-Acetaminophen] ITCHING    Vosol Hc [Hydrocortisone-Acetic Acid] ITCHING

## 2025-06-16 ENCOUNTER — PATIENT OUTREACH (OUTPATIENT)
Age: 55
End: 2025-06-16

## 2025-06-16 NOTE — PROGRESS NOTES
06/16/25 1201   Call Details   Call Attempt # 1   SDOH Domain(s) with needs Food Insecurity

## 2025-06-18 ENCOUNTER — TELEPHONE (OUTPATIENT)
Dept: FAMILY MEDICINE CLINIC | Facility: CLINIC | Age: 55
End: 2025-06-18

## 2025-06-18 NOTE — TELEPHONE ENCOUNTER
JOSE LUIS Russell...    Patient calling (verified full name and date of birth).  Stated she received a call from your office on 6-17-25 asking what dose of Levothyroxine she is on. No encounter found regarding this  Patient taking Levothyroxine 150 mcg daily  I did  update the medication list

## 2025-06-21 DIAGNOSIS — E03.9 HYPOTHYROIDISM, UNSPECIFIED TYPE: ICD-10-CM

## 2025-06-24 RX ORDER — LEVOTHYROXINE SODIUM 150 UG/1
150 TABLET ORAL
Qty: 90 TABLET | Refills: 3 | Status: SHIPPED | OUTPATIENT
Start: 2025-06-24

## 2025-07-03 ENCOUNTER — LAB ENCOUNTER (OUTPATIENT)
Dept: LAB | Age: 55
End: 2025-07-03
Attending: INTERNAL MEDICINE
Payer: COMMERCIAL

## 2025-07-03 ENCOUNTER — OFFICE VISIT (OUTPATIENT)
Facility: LOCATION | Age: 55
End: 2025-07-03

## 2025-07-03 VITALS
DIASTOLIC BLOOD PRESSURE: 84 MMHG | SYSTOLIC BLOOD PRESSURE: 118 MMHG | HEIGHT: 67 IN | BODY MASS INDEX: 29.35 KG/M2 | WEIGHT: 187 LBS | HEART RATE: 84 BPM

## 2025-07-03 DIAGNOSIS — E07.9 THYROID DISEASE: ICD-10-CM

## 2025-07-03 DIAGNOSIS — E27.9 ADRENAL GLAND DISEASE (HCC): ICD-10-CM

## 2025-07-03 DIAGNOSIS — E04.2 MULTIPLE THYROID NODULES: ICD-10-CM

## 2025-07-03 DIAGNOSIS — E27.9 ADRENAL DISEASE (HCC): ICD-10-CM

## 2025-07-03 DIAGNOSIS — E06.3 HYPOTHYROIDISM DUE TO HASHIMOTO THYROIDITIS: Primary | ICD-10-CM

## 2025-07-03 DIAGNOSIS — D35.02 ADENOMA OF LEFT ADRENAL GLAND: ICD-10-CM

## 2025-07-03 LAB
ANION GAP SERPL CALC-SCNC: 7 MMOL/L (ref 0–18)
BUN BLD-MCNC: 18 MG/DL (ref 9–23)
BUN/CREAT SERPL: 17.8 (ref 10–20)
CALCIUM BLD-MCNC: 9.3 MG/DL (ref 8.7–10.4)
CHLORIDE SERPL-SCNC: 105 MMOL/L (ref 98–112)
CO2 SERPL-SCNC: 27 MMOL/L (ref 21–32)
CORTIS SERPL-MCNC: 15.9 UG/DL
CREAT BLD-MCNC: 1.01 MG/DL (ref 0.55–1.02)
EGFRCR SERPLBLD CKD-EPI 2021: 66 ML/MIN/1.73M2 (ref 60–?)
FASTING STATUS PATIENT QL REPORTED: YES
GLUCOSE BLD-MCNC: 94 MG/DL (ref 70–99)
OSMOLALITY SERPL CALC.SUM OF ELEC: 290 MOSM/KG (ref 275–295)
POTASSIUM SERPL-SCNC: 4.5 MMOL/L (ref 3.5–5.1)
SODIUM SERPL-SCNC: 139 MMOL/L (ref 136–145)
TSI SER-ACNC: 1.95 UIU/ML (ref 0.55–4.78)

## 2025-07-03 PROCEDURE — 84443 ASSAY THYROID STIM HORMONE: CPT | Performed by: INTERNAL MEDICINE

## 2025-07-03 PROCEDURE — 82088 ASSAY OF ALDOSTERONE: CPT

## 2025-07-03 PROCEDURE — 36415 COLL VENOUS BLD VENIPUNCTURE: CPT | Performed by: INTERNAL MEDICINE

## 2025-07-03 PROCEDURE — 99205 OFFICE O/P NEW HI 60 MIN: CPT | Performed by: INTERNAL MEDICINE

## 2025-07-03 PROCEDURE — 80048 BASIC METABOLIC PNL TOTAL CA: CPT | Performed by: INTERNAL MEDICINE

## 2025-07-03 PROCEDURE — 84244 ASSAY OF RENIN: CPT

## 2025-07-03 PROCEDURE — 82533 TOTAL CORTISOL: CPT | Performed by: INTERNAL MEDICINE

## 2025-07-03 PROCEDURE — 83835 ASSAY OF METANEPHRINES: CPT

## 2025-07-03 RX ORDER — DEXAMETHASONE 1 MG
1 TABLET ORAL ONCE
Qty: 1 TABLET | Refills: 0 | Status: SHIPPED | OUTPATIENT
Start: 2025-07-03 | End: 2025-07-03

## 2025-07-03 NOTE — PROGRESS NOTES
New Patient Evaluation - History and Physical    CONSULT - Reason for Visit:    Left adrenal adenoma, hypothyroid, thyroid nodules   Requesting Physician:   ..Syed Neal MD  No referring provider defined for this encounter.    CHIEF COMPLAINT:    Chief Complaint   Patient presents with    Thyroid Problem     consult    Adrenal Problem     consult      Last completed office visit: Visit date not found   Next scheduled Follow up:   Future Appointments   Date Time Provider Department Center   7/3/2025  2:20 PM Sonam Newman MD [facility]      HISTORY OF PRESENT ILLNESS:   Dorcas Salas is a 55 year old female who presents with   Left adrenal adenoma, hypothyroid and thyroid nodules   She was seen by endocrine before. I reviewed notes and previous w/u     CT of the abdomen to have a left sided adrenal adenoma in 2016 after presenting to the hospital at the time with acute pancreatitis. It was found to be lipid rich and measuring 2.3cm. She had a repeat MRI of the abdomen a year later (9/2017) which showed it did not change in size and remained lipid rich.    No easy bruising          Hypothyroidism   due to hashimotos thyroiditis.  On LT4 150 mcg PO daily     no symptoms of pheochromocytoma. Patient denied episodes of headaches, sweating, palpitation, getting pale, or chest tightness      No thyroid disease in the family   Does not have autoimmune disease herself other thank hashimoto's    Compression symptoms: denied except the underlined ones SOB, dysphagia, odynophagia, change in voice, hoarseness, neck pain or neck mass.     The patient endorses the bolded symptoms: intolerance to cold, constipation, decreased lacrimation, fatigue; anxiety, heat intolerance, insomnia, tremors, wt loss, wt gain, irregular menses, lid lag, palpitations, proptosis, thinning hair; dyspnea, difficulty breathing when lying down, dysphagia, sensation of food getting stuck in the throat, choking sensation when lying flat,  pooling of saliva, dysphonia, voice changes, hoarseness; none.      Menstrual hx:  Patient's last menstrual period was 05/15/2025 (exact date).   Neck surgery: No  Neck radiation: No   Biotin: no  Turmeric:no  Family history of thyroid cancer in 1st degree relatives: no          ASSESSMENT AND PLAN:  Dorcas Salas is a 55 year old female who presents with  Left adrenal adenoma, hypothyroid and thyroid nodules   We discussed the incidence of thyroid nodules. The risk of malignancy in those nodules and the indication to get FNA/fine needle aspiration in some of the nodules to evaluate for malignancy in the nodules meet the criteria. Discussed with the patient in details FNA indication, risk/benefit ratio and complications.      Plan  Labs now . We need 2 sets of labs   # labs   8-9 am    # 1 mg dexamethasone suppression test - take 1 mg dexamethasone at midnight and do labs at 8 am the next morning (cortisol levels post dexamethasone)    Labs and follow up in 3 mo     Hypothyroid d/t hashimoto's  Thyroid medication dose: Levothyroxine 150 mcg/day   Take you medication on empty stomach, not with any other medication or food. Wait 60 minutes before eating. Wait 4 hours before taking Vitamins, Calcium or iron. On the morning of the lab test, please take the medication after the blood test not before. Do not take Biotin 1 week before blood test.       [email or web address]    Don’t take your thyroid hormone at the same time as:  Walnuts.  Soybean flour.  Cottonseed meal.  Iron supplements or multivitamins containing iron.  Calcium supplements.  Antacids that contain aluminum, magnesium or calcium.  Some ulcer medicines, such as sucralfate (Carafate).  Some cholesterol-lowering drugs, such as those containing cholestyramine (Prevalite, Locholest) and colestipol (Colestid).  To avoid possible problems, eat these foods or use these products several hours before or after you take your  thyroid medicine.    Supplements containing biotin, common in hair and nail products, can make it hard to measure how much thyroid hormone is in the body. Biotin does not affect thyroid hormone levels. But supplements that have biotin should be stopped for at least a week before measuring thyroid function so that the measurement is correct.      #Thyroid nodules   US thyroid 2021 Multinodular goiter.  1 year follow-up suggested.   Will repeat US thyroid   No risk factors for thyroid cancer     #Adrenal nodules  Adenoma on CT so no need for new imaging studies   Will do labs as above     CT ABD 9/21/2021   Left adrenal nodule measuring 2.3 x 2.0 x 2.0 cm is stable from the 2016 studies and is again compatible with a benign adenoma.      Thyroid medication dose: Levothyroxine   Take you medication on empty stomach, not with any other medication or food. Wait 60 minutes before eating. Wait 4 hours before taking Vitamins, Calcium or iron. On the morning of the lab test, please take the medication after the blood test not before. Do not take Biotin 1 week before blood test.           [email or web address]    Don’t take your thyroid hormone at the same time as:  Walnuts.  Soybean flour.  Cottonseed meal.  Iron supplements or multivitamins containing iron.  Calcium supplements.  Antacids that contain aluminum, magnesium or calcium.  Some ulcer medicines, such as sucralfate (Carafate).  Some cholesterol-lowering drugs, such as those containing cholestyramine (Prevalite, Locholest) and colestipol (Colestid).  To avoid possible problems, eat these foods or use these products several hours before or after you take your thyroid medicine.    Supplements containing biotin, common in hair and nail products, can make it hard to measure how much thyroid hormone is in the body. Biotin does not affect thyroid hormone levels. But supplements that have biotin should be stopped for at least a week before  measuring thyroid function so that the measurement is correct.          PAST MEDICAL HISTORY:   Past Medical History:    Adrenal adenoma, left    ANEMIA    iron deficiency since being a teenager    Depression    sees Dr. Glez    Hearing loss    pt has bilat hearing aids    Herpes labialis    HNP (herniated nucleus pulposus), cervical    C7    Hyperlipidemia    Hypothyroidism    Migraines    Neurologist prescribing medication       PAST SURGICAL HISTORY:   Past Surgical History:   Procedure Laterality Date    Back surgery  06/2010    cervical lami C6-7, fusion with titanium cage, no bleeding complications    D & c  2002    Other surgical history  2004    bunionectomy bilat       CURRENT MEDICATIONS:     dexamethasone 1 MG Oral Tab Take 1 tablet (1 mg total) by mouth one time for 1 dose. Take at midnight and do labs next morning at 8 am 1 tablet 0    levothyroxine 150 MCG Oral Tab Take 1 tablet (150 mcg total) by mouth before breakfast. 90 tablet 3    B Complex Vitamins (VITAMIN B COMPLEX OR) Inject as directed.      atorvastatin 20 MG Oral Tab Take 1 tablet (20 mg total) by mouth daily. 90 tablet 3    Ferrous Sulfate (FEROSUL) 325 (65 Fe) MG Oral Tab Take one tab daily  Mondays to Thurdays and 1 tab twice a day Friday, sat, Sunday 180 tablet 1    escitalopram (LEXAPRO) 20 MG Oral Tab Take 1 tablet (20 mg total) by mouth daily. 90 tablet 0    amitriptyline 25 MG Oral Tab amitriptyline 25 mg tablet   TAKE 1 TABLET BY MOUTH EVERY DAY AT BEDTIME      Multiple Vitamins-Minerals (BIOTIN PLUS/CALCIUM/VIT D3) Oral Tab Take by mouth.      gabapentin 300 MG Oral Cap Take 2 capsules (600 mg total) by mouth 2 (two) times a day. 300 MG tablet twice a day 120 capsule 5    aspirin 81 MG Oral Tab Take 1 tablet (81 mg total) by mouth daily.      Cholecalciferol (VITAMIN D) 1000 UNITS Oral Cap Take 2,000 Units by mouth daily.           ALLERGIES:  Allergies   Allergen Reactions    Cortisporin [Neomycin-Polymyxin-Hc] HIVES    Codeine  [Opioid Analgesics] ITCHING    Vicodin [Hydrocodone-Acetaminophen] ITCHING    Vosol Hc [Hydrocortisone-Acetic Acid] ITCHING       SOCIAL HISTORY:    Social History     Socioeconomic History    Marital status: Single    Number of children: 0   Occupational History    Occupation:    Tobacco Use    Smoking status: Former     Current packs/day: 0.00     Average packs/day: 0.5 packs/day for 20.0 years (10.0 ttl pk-yrs)     Types: Cigarettes     Start date: 1984     Quit date: 2004     Years since quittin.0    Smokeless tobacco: Never   Vaping Use    Vaping status: Never Used   Substance and Sexual Activity    Alcohol use: Yes     Comment: social     Drug use: No   Other Topics Concern    Caffeine Concern Yes    Exercise No        Smoking no  Marijuana yes every day   Etoh once a week     FAMILY HISTORY:   Family History   Problem Relation Age of Onset    Hypertension Mother     Other (primary biliary cirrhosis) Mother     Hypertension Father     Lipids Father     Heart Disorder Father         aortic valve replacement, CHF    Other (Other) Father     Other (dementia) Father     Other (CVA's starting late 30's) Maternal Grandmother     Cancer Paternal Grandmother 75        lung ca; tobacco user    Heart Disorder Paternal Grandfather         MI    Heart Disorder Paternal Uncle 60        CHF, CAD, PVD    Heart Disorder Paternal Uncle 65         pacemaker, PVD    Cancer Paternal Uncle 85        unknown type    Bleeding Disorders Neg     Clotting Disorder Neg     Breast Cancer Neg     Ovarian Cancer Neg         REVIEW OF SYSTEMS:  All negative other than HPI    PHYSICAL EXAM:   Height: 5' 7\" (170.2 cm) (803)  Weight: 187 lb (84.8 kg) (803)  BSA (Calculated - sq m): 1.97 sq meters (803)  Pulse: 84 (803)  BP: 118/84 (803)  Temp: --  Do Not Use - Resp Rate: --  SpO2: --    Body mass index is 29.29 kg/m².    CONSTITUTIONAL:  Awake and alert. Age  appropriate, good hygiene not in acute distress. Well-nourished and well developed. no acute distress   PSYCH:    Normal mood and affect,   cooperative  Neuro: speech is clear. Awake, alert, no aphasia, no facial asymmetry,    EYES:  No proptosis, no ptosis, conjunctiva normal  ENT:  Normocephalic, atraumatic  Eye: EOMI, normal lids, no discharge,    No rhinorrhea, moist oral mucosa  Neck: full range of motion  Neck/Thyroid: neck inspection: normal, No scar, No goiter   LUNGS:  No acute respiratory distress, non-labored respiration. Speaking full sentences  CARDIOVASCULAR:  regular rate   ABDOMEN:  No abdominal pain.   SKIN:  Skin is dry, no obvious rashes or lesions  EXTREMITIES: no gross abnormality   MSK: Moves extremities spontaneously.       DATA:     Pertinent data reviewed  US thyroid 2021 Multinodular goiter.  1 year follow-up suggested.   CT ABD 9/21/2021   Left adrenal nodule measuring 2.3 x 2.0 x 2.0 cm is stable from the 2016 studies and is again compatible with a benign adenoma.  ADRENALS:   There is a stable left adrenal nodule measuring 2.3 x 2.0 x 2.0 cm.  The mass measures 7.5 Hounsfield units on the precontrast series (series 2, image 67), 74 Hounsfield units after contrast administration (series 7, image 67) and 21   Hounsfield units on the more delayed postcontrast series consistent with an absolute washout of 79.7 percent (absolute washout greater or equal to 60 percent is compatible with a benign adenoma).     DATE OF SERVICE: 11.16.2018  MRI ADRENALS  CLINICAL INDICATION: Benign neoplasm of left adrenal gland.  TECHNIQUE: Multiplanar and multisequence MR imaging was performed through the abdomen/adrenals on a  1.5 Luiza magnet.  COMPARISON: MRI abdomen 9/1/2017. CT abdomen 10/26/2016.  FINDINGS:   Adrenal glands: The right adrenal gland is normal. Left adrenal gland 22 x 19 mm nodule is unchanged  in size. Again observed is signal drop off of the left adrenal nodule on chemical shift imaging  out  of phase sequence due to the presence of intracellular lipid.  Liver: The liver is normal in size and contour. Outer right hepatic lobe 6 mm and 11 mm T2  hyperintense structures are again noted, stable from prior study not exhibiting restricted  diffusion.  Spleen: Normal size.  Pancreas: No evidence of inflammation or ductal dilatation.   Gallbladder/bile ducts: No evidence of dilatation. No stones identified in the gallbladder lumen.  Kidneys: Normal size and contour with well-preserved cortex. Neither kidney is obstructed.  Other: No ascites or enlarged lymph nodes are seen within the abdomen. There are no specific  abnormalities of the visualized bowel.  =====  IMPRESSION:    1. Stable adenoma of the left adrenal gland.  2. Stable right hepatic lobe 6 mm and 11 mm lesions      No results found.    No results for input(s): \"TSH\", \"T4F\", \"T3F\", \"THYP\" in the last 72 hours.  TSH   Date Value Ref Range Status   08/22/2024 7.471 (H) 0.550 - 4.780 mIU/mL Final   07/08/2019 4.912 0.350 - 5.500 mIU/L Final   11/08/2014 5.550 (H) 0.450 - 4.500 uIU/mL Final   01/11/2008 5.131 0.350 - 5.500 uIU/mL Final     Lab Results   Component Value Date    A1C 5.2 06/02/2025    A1C 5.5 01/15/2022    A1C 5.4 11/16/2019    A1C 5.3 12/22/2018    A1C 5.3 10/28/2017           No results for input(s): \"TSH\", \"T4F\", \"T3F\", \"THYP\" in the last 72 hours.  No results found.    Orders Placed This Encounter   Procedures    TSH W Reflex To Free T4    TSH W Reflex To Free T4    Basic Metabolic Panel (8)    Metanephrines, Plasma Free    Cortisol    Plasma Renin Activity    Aldosterone, Serum    Dexamethasone Suppression Test (Cortisol)     Orders Placed This Encounter    TSH W Reflex To Free T4     Standing Status:   Future     Number of Occurrences:   1     Expected Date:   10/3/2025     Expiration Date:   7/3/2026     Release to patient:   Immediate    TSH W Reflex To Free T4     Release to patient:   Immediate    Basic Metabolic Panel (8)      Release to patient:   Immediate    Metanephrines, Plasma Free    Cortisol     Release to patient:   Immediate    Plasma Renin Activity     Standing Status:   Future     Expected Date:   7/3/2025     Expiration Date:   7/3/2026     Release to patient:   Immediate    Aldosterone, Serum     Release to patient:   Immediate    Dexamethasone Suppression Test (Cortisol)     Standing Status:   Future     Expected Date:   7/4/2025     Expiration Date:   7/3/2026     Release to patient:   Immediate    dexamethasone 1 MG Oral Tab     Sig: Take 1 tablet (1 mg total) by mouth one time for 1 dose. Take at midnight and do labs next morning at 8 am     Dispense:  1 tablet     Refill:  0          This is a specialized patient consultation in endocrinology and required comprehensive review of prior records, as well as current evaluation, with time required for consideration of complex endocrine issues and consultation. For this visit, I personally interviewed the patient, and family member if accompanied, performed the pertinent parts of the history and physical examination. ROS included screening for appropriate endocrine conditions.   Today's diagnosis and plan were reviewed in detail with the patient who states understanding and agrees with plan. I discussed with the patient possible diagnosis, differential diagnosis, need for work up, treatment options, alternatives and side effects.     Please see note for details about time spent which includes:   · pre-visit preparation  · reviewing records  · face to face time with the patient   · timely documentation of the encounter  · ordering medications/tests  · communication with care team  · care coordination    I appreciate the opportunity to be part of your patient's medical care and will keep you, as the referring and primary physicians, informed about the care of your patient. Please feel free to contact me should you have any questions.    The 21st Century Cures Act makes medical  notes like these available to patients in the interest of transparency. Please be advised this is a medical document. Medical documents are intended to carry relevant information, facts as evident, and the clinical opinion of the practitioner. The medical note is intended as peer to peer communication and may appear blunt or direct. It is written in medical language and may contain abbreviations or verbiage that are unfamiliar.   Frances Mares MD

## 2025-07-03 NOTE — PATIENT INSTRUCTIONS
Labs now . We need 2 sets of labs   # labs   8-9 am    # 1 mg dexamethasone suppression test - take 1 mg dexamethasone at midnight and do labs at 8 am the next morning (cortisol levels post dexamethasone)    Labs and follow up in 3 mo     Hypothyroid d/t hashimoto's  Thyroid medication dose: Levothyroxine 150 mcg/day   Take you medication on empty stomach, not with any other medication or food. Wait 60 minutes before eating. Wait 4 hours before taking Vitamins, Calcium or iron. On the morning of the lab test, please take the medication after the blood test not before. Do not take Biotin 1 week before blood test.       [email or web address]    Don’t take your thyroid hormone at the same time as:  Walnuts.  Soybean flour.  Cottonseed meal.  Iron supplements or multivitamins containing iron.  Calcium supplements.  Antacids that contain aluminum, magnesium or calcium.  Some ulcer medicines, such as sucralfate (Carafate).  Some cholesterol-lowering drugs, such as those containing cholestyramine (Prevalite, Locholest) and colestipol (Colestid).  To avoid possible problems, eat these foods or use these products several hours before or after you take your thyroid medicine.    Supplements containing biotin, common in hair and nail products, can make it hard to measure how much thyroid hormone is in the body. Biotin does not affect thyroid hormone levels. But supplements that have biotin should be stopped for at least a week before measuring thyroid function so that the measurement is correct.      #Thyroid nodules   US thyroid 2021 Multinodular goiter.  1 year follow-up suggested.   Will repeat US thyroid   No risk factors for thyroid cancer     #Adrenal nodules  Adenoma on CT so no need for new imaging studies   Will do labs as above     CT ABD 9/21/2021   Left adrenal nodule measuring 2.3 x 2.0 x 2.0 cm is stable from the 2016 studies and is again compatible with a benign adenoma.

## 2025-07-07 ENCOUNTER — PATIENT OUTREACH (OUTPATIENT)
Age: 55
End: 2025-07-07

## 2025-07-07 ENCOUNTER — LAB ENCOUNTER (OUTPATIENT)
Dept: LAB | Age: 55
End: 2025-07-07
Attending: INTERNAL MEDICINE
Payer: COMMERCIAL

## 2025-07-07 DIAGNOSIS — E04.2 MULTIPLE THYROID NODULES: ICD-10-CM

## 2025-07-07 DIAGNOSIS — E06.3 HYPOTHYROIDISM DUE TO HASHIMOTO THYROIDITIS: ICD-10-CM

## 2025-07-07 DIAGNOSIS — D35.02 ADENOMA OF LEFT ADRENAL GLAND: ICD-10-CM

## 2025-07-07 DIAGNOSIS — E07.9 THYROID DISEASE: ICD-10-CM

## 2025-07-07 DIAGNOSIS — E27.9 ADRENAL DISEASE (HCC): ICD-10-CM

## 2025-07-07 DIAGNOSIS — E27.9 ADRENAL GLAND DISEASE (HCC): ICD-10-CM

## 2025-07-07 LAB — CORTIS SERPL-MCNC: 2.3 UG/DL

## 2025-07-07 PROCEDURE — 36415 COLL VENOUS BLD VENIPUNCTURE: CPT

## 2025-07-07 PROCEDURE — 82533 TOTAL CORTISOL: CPT

## 2025-07-07 NOTE — PROGRESS NOTES
07/07/25 1353   Call Details   Call Attempt # 3   SDOH Domain(s) with needs Food Insecurity   Outreach Outcome   SDOH Overall Outreach Outcome Unable to reach (3 Attempts)

## 2025-07-09 LAB
PLASMA RENIN ACTIVITY: 1.4 NG/ML/HR
PLASMA RENIN ACTIVITY: 1.4 NG/ML/HR

## 2025-07-11 ENCOUNTER — RESULTS FOLLOW-UP (OUTPATIENT)
Facility: LOCATION | Age: 55
End: 2025-07-11

## 2025-07-11 DIAGNOSIS — D35.02 ADENOMA OF LEFT ADRENAL GLAND: Primary | ICD-10-CM

## 2025-07-11 DIAGNOSIS — E27.9 ADRENAL GLAND DISEASE (HCC): ICD-10-CM

## 2025-07-11 LAB
ALDOSTERONE: 8.7 NG/DL
METANEPHRINE: 36.4 PG/ML
NORMETANEPHRINE: 131.4 PG/ML

## 2025-07-12 NOTE — TELEPHONE ENCOUNTER
Please call pt with work up result   Normal metanephrine and normetanephrine   Normal shanae/renin   Cortisol levels are high after dexamethasone     We need more w/u   Review with pt how to do the following tests  #salivary cortisol x3 night at bedtime /not midnight   #24 hr urine cortisol       Will discuss more next visit   Thanks   Latest Reference Range & Units 07/03/25 09:10 07/07/25 08:13   RENIN ACTIVITY ng/mL/hr 1.4    NORMETANEPHRINE 0.0 - 244.0 pg/mL 131.4    METANEPHRINE 0.0 - 88.0 pg/mL 36.4    Patient Fasting for BMP?  Yes    TSH 0.550 - 4.780 uIU/mL 1.947    Cortisol ug/dL 15.9    ALDOSTERONE 0.0 - 30.0 ng/dL 8.7    Dexa Suppress Cortisol ug/dL  2.3         SALIVARY CORTISOL    Please  the saliva collection kit from the lab. The instructions for the saliva collection will be provided to you. We need two separate salivary cortisol collections. You can do them 3 days apart.     - Do not brush teeth for 1 hour before collecting specimen.   -Do not eat or drink for 30 minutes prior to specimen collection.   -Do not drink alcohol or smoke 12 hours before  -Collect specimen at bedtime and record collection time.   -Do not apply any type of oral steroid creams as that can interfere with the test results.   -Using the [place] collection tube, remove the cotton roll & place under the tongue. Chew lightly until cotton roll is saturated with saliva. Return cotton roll to collection tube & cap. Label tube with name & collection time/date. Refrigerate tube until it can be mailed or taken to the lab      How to Collect the 24-hour Urine Specimen  Decide on a day to do the test.  On the day of the test, patient empty bladder (urinate or pee) in the toilet right after waking up. Flush the urine down the toilet.  The test begins now with the bladder empty. Write this date and the start time on the storage container’s label.  For the next 24 hours, patient will need to pee into a collection container every time  they go to the bathroom. Females can use a toilet hat. Males can use a plastic urinal or pee right into the large storage container. If you do not have a toilet hat or urinal at home, you may use some other clean plastic container- or get them from labs   Before using the plastic container for the first time, wash it with dish soap and then rinse at least 10 times with tap water. Allow it to air dry completely.  Do not let feces (poop) mix with the urine or else the test will need to be restarted.  Pour the urine into the large storage container and close the lid tightly. Be very careful not to spill any urine.  If using a collection container, rinse it with water only. Put it back by the toilet to remind you to use it the next time. Allow it to air dry completely.  Put the large storage container in the refrigerator ( or in put ice around the container and place in larger container). The urine must be kept cool at all times. If you do not have space in the refrigerator, you can store it in a cooler on top of Add more ice as needed to keep the urine cold.  Each time patient pees during the day and night, follow steps to collect urine.  The next day (close to the same time that you started on the first day), patient needs to pee into the collection container one last time. Add it to the large storage container. This ends the 24-hour collection.  Write the date and time of this last urine collection on the label.  Attach a list of all medicines, including over-the-counter medicines, vitamins and herbal remedies, patient took during the 24-hour urine collection.    Take the urine to a Laboratory (Lab) Service Center as soon as possible, within 24 hours after ending the collection. Keep the urine cool.  Make sure the urine does not freeze for these tests: amylase, arylsulfatase, immunoelectrophoresis, micro-albumin, pregnanetriol, protein or uric acid.  You will need to start the test over if any of the urine spilled,  you forgot to save some or it has poop in it. If you must restart the test, it is okay to use the same collection and storage containers. Pour out the urine, clean the containers well and allow them to air dry. Then follow

## (undated) DIAGNOSIS — D50.8 OTHER IRON DEFICIENCY ANEMIA: Primary | ICD-10-CM

## (undated) DIAGNOSIS — Z13.6 SCREENING FOR CARDIOVASCULAR CONDITION: Primary | ICD-10-CM

## (undated) DIAGNOSIS — D50.9 IRON DEFICIENCY ANEMIA, UNSPECIFIED IRON DEFICIENCY ANEMIA TYPE: ICD-10-CM

## (undated) NOTE — LETTER
[address], [address]  [place], [place]       11/20/19        Patient: Minh Rider   YOB: 1970   Date of Visit: 11/19/2019       Dear  Dr. Mary Wolff MD,      Thank you for referring Minh Rider to my practi

## (undated) NOTE — ED AVS SNAPSHOT
Rosaura Carlton   MRN: [id number]    Department:  [facility] Emergency Department   Date of Visit:  7/10/2018           Disclosure     Insurance plans vary and the physician(s) referred by the ER may not be covered by your plan.  Please cont CARE PHYSICIAN AT ONCE OR RETURN IMMEDIATELY TO THE EMERGENCY DEPARTMENT. If you have been prescribed any medication(s), please fill your prescription right away and begin taking the medication(s) as directed.   If you believe that any of the medications

## (undated) NOTE — Clinical Note
Atena, It looks like you haven't had a chance to review path result. Path findings seems to indicate possible microscopic/lymphocytic colitis. Would you concur with this? If so, how do you typically dose budesonide?